# Patient Record
Sex: MALE | HISPANIC OR LATINO | Employment: FULL TIME | URBAN - METROPOLITAN AREA
[De-identification: names, ages, dates, MRNs, and addresses within clinical notes are randomized per-mention and may not be internally consistent; named-entity substitution may affect disease eponyms.]

---

## 2019-08-28 ENCOUNTER — HOSPITAL ENCOUNTER (OUTPATIENT)
Facility: MEDICAL CENTER | Age: 39
End: 2019-08-29
Attending: EMERGENCY MEDICINE | Admitting: INTERNAL MEDICINE

## 2019-08-28 ENCOUNTER — APPOINTMENT (OUTPATIENT)
Dept: RADIOLOGY | Facility: MEDICAL CENTER | Age: 39
End: 2019-08-28
Attending: STUDENT IN AN ORGANIZED HEALTH CARE EDUCATION/TRAINING PROGRAM

## 2019-08-28 ENCOUNTER — APPOINTMENT (OUTPATIENT)
Dept: RADIOLOGY | Facility: MEDICAL CENTER | Age: 39
End: 2019-08-28
Attending: EMERGENCY MEDICINE

## 2019-08-28 DIAGNOSIS — F10.10 ALCOHOL ABUSE: ICD-10-CM

## 2019-08-28 DIAGNOSIS — R10.84 GENERALIZED ABDOMINAL PAIN: ICD-10-CM

## 2019-08-28 DIAGNOSIS — F15.10 METHAMPHETAMINE ABUSE (HCC): ICD-10-CM

## 2019-08-28 DIAGNOSIS — K29.00 OTHER ACUTE GASTRITIS WITHOUT HEMORRHAGE: ICD-10-CM

## 2019-08-28 PROBLEM — E87.6 HYPOKALEMIA: Status: ACTIVE | Noted: 2019-08-28

## 2019-08-28 PROBLEM — K29.70 GASTRITIS: Status: ACTIVE | Noted: 2019-08-28

## 2019-08-28 PROBLEM — K59.00 CN (CONSTIPATION): Status: ACTIVE | Noted: 2019-08-28

## 2019-08-28 PROBLEM — F19.10 POLYSUBSTANCE ABUSE (HCC): Status: ACTIVE | Noted: 2019-08-28

## 2019-08-28 LAB
ALBUMIN SERPL BCP-MCNC: 5.2 G/DL (ref 3.2–4.9)
ALBUMIN/GLOB SERPL: 1.9 G/DL
ALP SERPL-CCNC: 79 U/L (ref 30–99)
ALT SERPL-CCNC: 27 U/L (ref 2–50)
AMPHET UR QL SCN: POSITIVE
ANION GAP SERPL CALC-SCNC: 10 MMOL/L (ref 0–11.9)
APPEARANCE UR: CLEAR
AST SERPL-CCNC: 20 U/L (ref 12–45)
BARBITURATES UR QL SCN: NEGATIVE
BASOPHILS # BLD AUTO: 0.3 % (ref 0–1.8)
BASOPHILS # BLD: 0.03 K/UL (ref 0–0.12)
BENZODIAZ UR QL SCN: NEGATIVE
BILIRUB SERPL-MCNC: 0.5 MG/DL (ref 0.1–1.5)
BILIRUB UR QL STRIP.AUTO: NEGATIVE
BUN SERPL-MCNC: 19 MG/DL (ref 8–22)
BZE UR QL SCN: NEGATIVE
CALCIUM SERPL-MCNC: 9.6 MG/DL (ref 8.5–10.5)
CANNABINOIDS UR QL SCN: NEGATIVE
CHLORIDE SERPL-SCNC: 104 MMOL/L (ref 96–112)
CO2 SERPL-SCNC: 24 MMOL/L (ref 20–33)
COLOR UR: YELLOW
CREAT SERPL-MCNC: 0.91 MG/DL (ref 0.5–1.4)
EOSINOPHIL # BLD AUTO: 0.04 K/UL (ref 0–0.51)
EOSINOPHIL NFR BLD: 0.4 % (ref 0–6.9)
ERYTHROCYTE [DISTWIDTH] IN BLOOD BY AUTOMATED COUNT: 41.9 FL (ref 35.9–50)
ETHANOL BLD-MCNC: 0 G/DL
GLOBULIN SER CALC-MCNC: 2.8 G/DL (ref 1.9–3.5)
GLUCOSE SERPL-MCNC: 133 MG/DL (ref 65–99)
GLUCOSE UR STRIP.AUTO-MCNC: 250 MG/DL
HCT VFR BLD AUTO: 45 % (ref 42–52)
HGB BLD-MCNC: 15.8 G/DL (ref 14–18)
IMM GRANULOCYTES # BLD AUTO: 0.04 K/UL (ref 0–0.11)
IMM GRANULOCYTES NFR BLD AUTO: 0.4 % (ref 0–0.9)
KETONES UR STRIP.AUTO-MCNC: NEGATIVE MG/DL
LEUKOCYTE ESTERASE UR QL STRIP.AUTO: NEGATIVE
LIPASE SERPL-CCNC: 30 U/L (ref 11–82)
LIPASE SERPL-CCNC: 31 U/L (ref 11–82)
LYMPHOCYTES # BLD AUTO: 1.06 K/UL (ref 1–4.8)
LYMPHOCYTES NFR BLD: 11.5 % (ref 22–41)
MAGNESIUM SERPL-MCNC: 2.1 MG/DL (ref 1.5–2.5)
MCH RBC QN AUTO: 30.7 PG (ref 27–33)
MCHC RBC AUTO-ENTMCNC: 35.1 G/DL (ref 33.7–35.3)
MCV RBC AUTO: 87.5 FL (ref 81.4–97.8)
METHADONE UR QL SCN: NEGATIVE
MICRO URNS: ABNORMAL
MONOCYTES # BLD AUTO: 0.35 K/UL (ref 0–0.85)
MONOCYTES NFR BLD AUTO: 3.8 % (ref 0–13.4)
NEUTROPHILS # BLD AUTO: 7.7 K/UL (ref 1.82–7.42)
NEUTROPHILS NFR BLD: 83.6 % (ref 44–72)
NITRITE UR QL STRIP.AUTO: NEGATIVE
NRBC # BLD AUTO: 0 K/UL
NRBC BLD-RTO: 0 /100 WBC
OPIATES UR QL SCN: POSITIVE
OXYCODONE UR QL SCN: NEGATIVE
PCP UR QL SCN: NEGATIVE
PH UR STRIP.AUTO: 6 [PH] (ref 5–8)
PLATELET # BLD AUTO: 206 K/UL (ref 164–446)
PMV BLD AUTO: 10 FL (ref 9–12.9)
POTASSIUM SERPL-SCNC: 3.5 MMOL/L (ref 3.6–5.5)
PROPOXYPH UR QL SCN: NEGATIVE
PROT SERPL-MCNC: 8 G/DL (ref 6–8.2)
PROT UR QL STRIP: NEGATIVE MG/DL
RBC # BLD AUTO: 5.14 M/UL (ref 4.7–6.1)
RBC UR QL AUTO: NEGATIVE
SODIUM SERPL-SCNC: 138 MMOL/L (ref 135–145)
SP GR UR STRIP.AUTO: 1.04
UROBILINOGEN UR STRIP.AUTO-MCNC: 0.2 MG/DL
WBC # BLD AUTO: 9.2 K/UL (ref 4.8–10.8)

## 2019-08-28 PROCEDURE — 81003 URINALYSIS AUTO W/O SCOPE: CPT

## 2019-08-28 PROCEDURE — 700111 HCHG RX REV CODE 636 W/ 250 OVERRIDE (IP): Performed by: STUDENT IN AN ORGANIZED HEALTH CARE EDUCATION/TRAINING PROGRAM

## 2019-08-28 PROCEDURE — 700105 HCHG RX REV CODE 258: Performed by: EMERGENCY MEDICINE

## 2019-08-28 PROCEDURE — 96375 TX/PRO/DX INJ NEW DRUG ADDON: CPT

## 2019-08-28 PROCEDURE — 80307 DRUG TEST PRSMV CHEM ANLYZR: CPT

## 2019-08-28 PROCEDURE — 700101 HCHG RX REV CODE 250: Performed by: STUDENT IN AN ORGANIZED HEALTH CARE EDUCATION/TRAINING PROGRAM

## 2019-08-28 PROCEDURE — 74177 CT ABD & PELVIS W/CONTRAST: CPT

## 2019-08-28 PROCEDURE — 80053 COMPREHEN METABOLIC PANEL: CPT

## 2019-08-28 PROCEDURE — 83735 ASSAY OF MAGNESIUM: CPT

## 2019-08-28 PROCEDURE — A9270 NON-COVERED ITEM OR SERVICE: HCPCS | Performed by: EMERGENCY MEDICINE

## 2019-08-28 PROCEDURE — 700117 HCHG RX CONTRAST REV CODE 255: Performed by: EMERGENCY MEDICINE

## 2019-08-28 PROCEDURE — 70450 CT HEAD/BRAIN W/O DYE: CPT

## 2019-08-28 PROCEDURE — 96365 THER/PROPH/DIAG IV INF INIT: CPT

## 2019-08-28 PROCEDURE — 96372 THER/PROPH/DIAG INJ SC/IM: CPT

## 2019-08-28 PROCEDURE — 700102 HCHG RX REV CODE 250 W/ 637 OVERRIDE(OP): Performed by: EMERGENCY MEDICINE

## 2019-08-28 PROCEDURE — 83690 ASSAY OF LIPASE: CPT | Mod: 91

## 2019-08-28 PROCEDURE — 99218 PR INITIAL OBSERVATION CARE,LEVL I: CPT | Mod: GC | Performed by: INTERNAL MEDICINE

## 2019-08-28 PROCEDURE — G0378 HOSPITAL OBSERVATION PER HR: HCPCS

## 2019-08-28 PROCEDURE — A9270 NON-COVERED ITEM OR SERVICE: HCPCS | Performed by: STUDENT IN AN ORGANIZED HEALTH CARE EDUCATION/TRAINING PROGRAM

## 2019-08-28 PROCEDURE — 700102 HCHG RX REV CODE 250 W/ 637 OVERRIDE(OP): Performed by: STUDENT IN AN ORGANIZED HEALTH CARE EDUCATION/TRAINING PROGRAM

## 2019-08-28 PROCEDURE — 36415 COLL VENOUS BLD VENIPUNCTURE: CPT

## 2019-08-28 PROCEDURE — 96376 TX/PRO/DX INJ SAME DRUG ADON: CPT

## 2019-08-28 PROCEDURE — 99285 EMERGENCY DEPT VISIT HI MDM: CPT

## 2019-08-28 PROCEDURE — 85025 COMPLETE CBC W/AUTO DIFF WBC: CPT

## 2019-08-28 PROCEDURE — 700111 HCHG RX REV CODE 636 W/ 250 OVERRIDE (IP): Performed by: EMERGENCY MEDICINE

## 2019-08-28 RX ORDER — LORAZEPAM 0.5 MG/1
2 TABLET ORAL
Status: DISCONTINUED | OUTPATIENT
Start: 2019-08-28 | End: 2019-08-29 | Stop reason: HOSPADM

## 2019-08-28 RX ORDER — LORAZEPAM 0.5 MG/1
0.5 TABLET ORAL EVERY 4 HOURS PRN
Status: DISCONTINUED | OUTPATIENT
Start: 2019-08-28 | End: 2019-08-29 | Stop reason: HOSPADM

## 2019-08-28 RX ORDER — LORAZEPAM 2 MG/ML
1 INJECTION INTRAMUSCULAR
Status: DISCONTINUED | OUTPATIENT
Start: 2019-08-28 | End: 2019-08-29 | Stop reason: HOSPADM

## 2019-08-28 RX ORDER — POTASSIUM CHLORIDE 20 MEQ/1
40 TABLET, EXTENDED RELEASE ORAL DAILY
Status: DISCONTINUED | OUTPATIENT
Start: 2019-08-28 | End: 2019-08-29 | Stop reason: HOSPADM

## 2019-08-28 RX ORDER — LORAZEPAM 2 MG/ML
2 INJECTION INTRAMUSCULAR
Status: DISCONTINUED | OUTPATIENT
Start: 2019-08-28 | End: 2019-08-29 | Stop reason: HOSPADM

## 2019-08-28 RX ORDER — THIAMINE MONONITRATE (VIT B1) 100 MG
100 TABLET ORAL DAILY
Status: DISCONTINUED | OUTPATIENT
Start: 2019-08-29 | End: 2019-08-29

## 2019-08-28 RX ORDER — LORAZEPAM 1 MG/1
4 TABLET ORAL
Status: DISCONTINUED | OUTPATIENT
Start: 2019-08-28 | End: 2019-08-29 | Stop reason: HOSPADM

## 2019-08-28 RX ORDER — MORPHINE SULFATE 4 MG/ML
4 INJECTION, SOLUTION INTRAMUSCULAR; INTRAVENOUS ONCE
Status: COMPLETED | OUTPATIENT
Start: 2019-08-28 | End: 2019-08-28

## 2019-08-28 RX ORDER — LORAZEPAM 1 MG/1
3 TABLET ORAL
Status: DISCONTINUED | OUTPATIENT
Start: 2019-08-28 | End: 2019-08-29 | Stop reason: HOSPADM

## 2019-08-28 RX ORDER — POLYETHYLENE GLYCOL 3350 17 G/17G
1 POWDER, FOR SOLUTION ORAL
Status: DISCONTINUED | OUTPATIENT
Start: 2019-08-28 | End: 2019-08-29 | Stop reason: HOSPADM

## 2019-08-28 RX ORDER — PANTOPRAZOLE SODIUM 20 MG/1
40 TABLET, DELAYED RELEASE ORAL DAILY
Qty: 30 TAB | Refills: 0 | Status: SHIPPED | OUTPATIENT
Start: 2019-08-28 | End: 2021-08-03

## 2019-08-28 RX ORDER — ACETAMINOPHEN 325 MG/1
650 TABLET ORAL EVERY 6 HOURS PRN
Status: DISCONTINUED | OUTPATIENT
Start: 2019-08-28 | End: 2019-08-29 | Stop reason: HOSPADM

## 2019-08-28 RX ORDER — LORAZEPAM 2 MG/ML
1.5 INJECTION INTRAMUSCULAR
Status: DISCONTINUED | OUTPATIENT
Start: 2019-08-28 | End: 2019-08-29 | Stop reason: HOSPADM

## 2019-08-28 RX ORDER — AMOXICILLIN 250 MG
2 CAPSULE ORAL 2 TIMES DAILY
Status: DISCONTINUED | OUTPATIENT
Start: 2019-08-28 | End: 2019-08-29 | Stop reason: HOSPADM

## 2019-08-28 RX ORDER — OMEPRAZOLE 20 MG/1
40 CAPSULE, DELAYED RELEASE ORAL ONCE
Status: COMPLETED | OUTPATIENT
Start: 2019-08-28 | End: 2019-08-28

## 2019-08-28 RX ORDER — FOLIC ACID 1 MG/1
1 TABLET ORAL DAILY
Status: DISCONTINUED | OUTPATIENT
Start: 2019-08-29 | End: 2019-08-29

## 2019-08-28 RX ORDER — LORAZEPAM 0.5 MG/1
1 TABLET ORAL EVERY 4 HOURS PRN
Status: DISCONTINUED | OUTPATIENT
Start: 2019-08-28 | End: 2019-08-29 | Stop reason: HOSPADM

## 2019-08-28 RX ORDER — ONDANSETRON 2 MG/ML
4 INJECTION INTRAMUSCULAR; INTRAVENOUS ONCE
Status: COMPLETED | OUTPATIENT
Start: 2019-08-28 | End: 2019-08-28

## 2019-08-28 RX ORDER — BISACODYL 10 MG
10 SUPPOSITORY, RECTAL RECTAL
Status: DISCONTINUED | OUTPATIENT
Start: 2019-08-28 | End: 2019-08-29 | Stop reason: HOSPADM

## 2019-08-28 RX ORDER — LORAZEPAM 2 MG/ML
0.5 INJECTION INTRAMUSCULAR EVERY 4 HOURS PRN
Status: DISCONTINUED | OUTPATIENT
Start: 2019-08-28 | End: 2019-08-29 | Stop reason: HOSPADM

## 2019-08-28 RX ORDER — SODIUM CHLORIDE 9 MG/ML
1000 INJECTION, SOLUTION INTRAVENOUS ONCE
Status: COMPLETED | OUTPATIENT
Start: 2019-08-28 | End: 2019-08-28

## 2019-08-28 RX ADMIN — IOHEXOL 100 ML: 350 INJECTION, SOLUTION INTRAVENOUS at 08:15

## 2019-08-28 RX ADMIN — OMEPRAZOLE 40 MG: 20 CAPSULE, DELAYED RELEASE ORAL at 10:43

## 2019-08-28 RX ADMIN — POTASSIUM CHLORIDE 40 MEQ: 20 TABLET, EXTENDED RELEASE ORAL at 16:22

## 2019-08-28 RX ADMIN — ENOXAPARIN SODIUM 40 MG: 100 INJECTION SUBCUTANEOUS at 16:24

## 2019-08-28 RX ADMIN — SENNOSIDES, DOCUSATE SODIUM 2 TABLET: 50; 8.6 TABLET, FILM COATED ORAL at 17:33

## 2019-08-28 RX ADMIN — ONDANSETRON 4 MG: 2 INJECTION INTRAMUSCULAR; INTRAVENOUS at 06:30

## 2019-08-28 RX ADMIN — SODIUM CHLORIDE 1000 ML: 9 INJECTION, SOLUTION INTRAVENOUS at 06:32

## 2019-08-28 RX ADMIN — MORPHINE SULFATE 4 MG: 4 INJECTION INTRAVENOUS at 06:30

## 2019-08-28 RX ADMIN — THIAMINE HYDROCHLORIDE 1000 ML: 100 INJECTION, SOLUTION INTRAMUSCULAR; INTRAVENOUS at 15:40

## 2019-08-28 RX ADMIN — LIDOCAINE HYDROCHLORIDE 30 ML: 20 SOLUTION OROPHARYNGEAL at 09:29

## 2019-08-28 RX ADMIN — ONDANSETRON 4 MG: 2 INJECTION INTRAMUSCULAR; INTRAVENOUS at 10:31

## 2019-08-28 ASSESSMENT — LIFESTYLE VARIABLES
DOES PATIENT WANT TO TALK TO SOMEONE ABOUT QUITTING: NO
TOTAL SCORE: 0
EVER FELT BAD OR GUILTY ABOUT YOUR DRINKING: NO
ALCOHOL_USE: YES
ON A TYPICAL DAY WHEN YOU DRINK ALCOHOL HOW MANY DRINKS DO YOU HAVE: 3
ORIENTATION AND CLOUDING OF SENSORIUM: ORIENTED AND CAN DO SERIAL ADDITIONS
ORIENTATION AND CLOUDING OF SENSORIUM: ORIENTED AND CAN DO SERIAL ADDITIONS
HOW MANY TIMES IN THE PAST YEAR HAVE YOU HAD 5 OR MORE DRINKS IN A DAY: 20
ANXIETY: NO ANXIETY (AT EASE)
EVER HAD A DRINK FIRST THING IN THE MORNING TO STEADY YOUR NERVES TO GET RID OF A HANGOVER: YES
TOTAL SCORE: 2
ANXIETY: NO ANXIETY (AT EASE)
TOTAL SCORE: 2
DOES PATIENT WANT TO STOP DRINKING: YES
HAVE YOU EVER FELT YOU SHOULD CUT DOWN ON YOUR DRINKING: NO
HEADACHE, FULLNESS IN HEAD: NOT PRESENT
AGITATION: NORMAL ACTIVITY
AVERAGE NUMBER OF DAYS PER WEEK YOU HAVE A DRINK CONTAINING ALCOHOL: 3
TOTAL SCORE: 2
TOTAL SCORE: 1
PAROXYSMAL SWEATS: BARELY PERCEPTIBLE SWEATING. PALMS MOIST
VISUAL DISTURBANCES: NOT PRESENT
NAUSEA AND VOMITING: NO NAUSEA AND NO VOMITING
TREMOR: NO TREMOR
CONSUMPTION TOTAL: POSITIVE
HAVE PEOPLE ANNOYED YOU BY CRITICIZING YOUR DRINKING: YES
NAUSEA AND VOMITING: NO NAUSEA AND NO VOMITING
AGITATION: NORMAL ACTIVITY
AUDITORY DISTURBANCES: NOT PRESENT
HEADACHE, FULLNESS IN HEAD: NOT PRESENT
EVER_SMOKED: YES
TREMOR: NO TREMOR
VISUAL DISTURBANCES: NOT PRESENT
PAROXYSMAL SWEATS: NO SWEAT VISIBLE
AUDITORY DISTURBANCES: NOT PRESENT

## 2019-08-28 ASSESSMENT — ENCOUNTER SYMPTOMS
CHILLS: 0
PALPITATIONS: 0
BLOOD IN STOOL: 0
MYALGIAS: 0
DIARRHEA: 0
NAUSEA: 0
DIZZINESS: 0
CONSTIPATION: 1
SHORTNESS OF BREATH: 0
FEVER: 0
VOMITING: 0
ABDOMINAL PAIN: 1

## 2019-08-28 ASSESSMENT — PATIENT HEALTH QUESTIONNAIRE - PHQ9
1. LITTLE INTEREST OR PLEASURE IN DOING THINGS: NOT AT ALL
SUM OF ALL RESPONSES TO PHQ9 QUESTIONS 1 AND 2: 0
2. FEELING DOWN, DEPRESSED, IRRITABLE, OR HOPELESS: NOT AT ALL

## 2019-08-28 NOTE — ED NOTES
Pt remains groggy, difficult to assess while ERP at the bedside.  Eyes open to voice but then back to sleep.  VSS, 97% on RA.

## 2019-08-28 NOTE — ASSESSMENT & PLAN NOTE
The patient reports that he drinks six beers per day. Therefore, he should be monitored for withdrawal symptoms.  No reports of withdrawal symptoms overnight.  No need for CIWA medications.    - Detox IV  - Multivitamin  - CIWA protocol

## 2019-08-28 NOTE — ASSESSMENT & PLAN NOTE
Mr. De Guzman presented with generalized abdominal pain of 12 hours duration. There has been no nausea, vomiting, or diarrhea. Abdominal examination was benign. CBC, CMP, and lipase were normal. CT scan was normal, but appendicitis could not be ruled out. The patient recently ate at a new pizza restaurant with his brother, and the brother experienced abdominal pain and diarrhea several hours after the meal.   -This morning, symptoms have resolved  -Clear liquid diet and advance as tolerated.  - Lipase 30.

## 2019-08-28 NOTE — ED NOTES
Pt has admit orders, awaiting a bed assignment.  Pt remains groggy, rouses to voice.  Denies abd pain currently.

## 2019-08-28 NOTE — CONSULTS
8/28/2019  Surgery consult    CC: Abdominal pain    HPI: Aristides De Guzman is a 39 y.o. male.  He primarily Somali-speaking  He was seen with the assistance of the telemedicine .  He reports 1 to history of abdominal pain.  He states pain is left upper quadrant.  He states his pain is from hunger  He states pain is got significant emergency room in the emergency department.  He states not associated with any fever chills nausea or vomiting.    History reviewed. No pertinent past medical history.    History reviewed. No pertinent surgical history.    No current facility-administered medications for this encounter.      Current Outpatient Medications   Medication Sig Dispense Refill   • pantoprazole (PROTONIX) 20 MG tablet Take 2 Tabs by mouth every day. 30 Tab 0       Social History     Socioeconomic History   • Marital status:      Spouse name: Not on file   • Number of children: Not on file   • Years of education: Not on file   • Highest education level: Not on file   Occupational History   • Not on file   Social Needs   • Financial resource strain: Not on file   • Food insecurity:     Worry: Not on file     Inability: Not on file   • Transportation needs:     Medical: Not on file     Non-medical: Not on file   Tobacco Use   • Smoking status: Current Some Day Smoker   • Smokeless tobacco: Never Used   Substance and Sexual Activity   • Alcohol use: Not Currently   • Drug use: Not Currently   • Sexual activity: Not on file   Lifestyle   • Physical activity:     Days per week: Not on file     Minutes per session: Not on file   • Stress: Not on file   Relationships   • Social connections:     Talks on phone: Not on file     Gets together: Not on file     Attends Catholic service: Not on file     Active member of club or organization: Not on file     Attends meetings of clubs or organizations: Not on file     Relationship status: Not on file   • Intimate partner violence:     Fear of current or ex  "partner: Not on file     Emotionally abused: Not on file     Physically abused: Not on file     Forced sexual activity: Not on file   Other Topics Concern   • Not on file   Social History Narrative   • Not on file       Family History   Family history unknown: Yes       Allergies:  Patient has no known allergies.    Review of Systems:  Positive for abdominal pain as above otherwise review of systems negative per AMA guidelines    Physical Exam:  /85   Pulse 70   Temp 36.1 °C (97 °F)   Resp 16   Ht 1.702 m (5' 7\")   Wt 90.9 kg (200 lb 6.4 oz)   SpO2 99%     Constitutional: Awake, alert, oriented x3. No acute distress. GCS 15. E4 V5 M6.  Head: No cephalohematoma.  Normocephalic atraumatic.    Neck: No tracheal deviation. No stridor.  Cardiovascular: Normal rate, skin warm brisk capillary refill.    Pulmonary/Chest: Breathing with ease no cough.  No chest wall tenderness bilaterally.  No crepitus. Positive breath sounds bilaterally.   Abdominal: Soft, nondistended.  Patient reports tenderness discomfort left upper quadrant but nontender to palpation.  Musculoskeletal: Warm dry moving all palpable pulses.  Neurological: Awake alert appropriate reports sensation intact  Skin: Skin is warm and dry.    Psychiatric:  Normal mood and affect.  Behavior is appropriate.       Labs:  Recent Labs     08/28/19  0625   WBC 9.2   RBC 5.14   HEMOGLOBIN 15.8   HEMATOCRIT 45.0   MCV 87.5   MCH 30.7   MCHC 35.1   RDW 41.9   PLATELETCT 206   MPV 10.0     Recent Labs     08/28/19  0625   SODIUM 138   POTASSIUM 3.5*   CHLORIDE 104   CO2 24   GLUCOSE 133*   BUN 19   CREATININE 0.91   CALCIUM 9.6         Recent Labs     08/28/19  0625   ASTSGOT 20   ALTSGPT 27   TBILIRUBIN 0.5   ALKPHOSPHAT 79   GLOBULIN 2.8       Radiology:  CT-ABDOMEN-PELVIS WITH   Final Result      Minimal mesenteric inflammatory change in the right lower quadrant with no apparent source. As indicated above, the appendix is not visualized. That regard, early " appendicitis cannot be completely excluded, and clinical correlation is recommended. These    findings were discussed with TACO ALMAZAN on 8/28/2019 at 0800 hours.            Assessment: This is a 39 y.o.-year-old male CT scan as above    Plan:   Discussed with patient findings.  Discussed possible appendicitis.  Discussed options including surgery.  Discussed appendectomy risk benefits alternatives.  He demonstrated understanding but declined surgery.  Discussed risks that he may have appendicitis which could grow worse.  He demonstrated understanding and agreed to return if pain returns or increased.        Anmol Loredo MD, FACS  [facility] [phone]

## 2019-08-28 NOTE — ED NOTES
Med Rec completed per patient and family   Allergies reviewed  No ORAL antibiotics in last 14 days

## 2019-08-28 NOTE — ED NOTES
Pt medicated as ordered for upset stomach.  Pt provided a boxed meal per his request and okayed by Dr. Loredo .

## 2019-08-28 NOTE — ED NOTES
Detox bag infusing.  Continue to await a bed assignment.  Pt updated on poc.  Pt denies abd pain & nausea at this time.  Remains somewhat groggy, but more alert from previoius.

## 2019-08-28 NOTE — DISCHARGE INSTRUCTIONS
Discharge Instructions    Discharged to home by car with relative. Discharged via wheelchair, hospital escort: Yes.  Special equipment needed: Not Applicable    Be sure to schedule a follow-up appointment with your primary care doctor or any specialists as instructed.     Discharge Plan:   Diet Plan: Discussed  Activity Level: Discussed  Smoking Cessation Offered: Patient Counseled  Confirmed Follow up Appointment: Patient to Call and Schedule Appointment  Confirmed Symptoms Management: Discussed  Medication Reconciliation Updated: Yes  Influenza Vaccine Indication: Indicated: Not available from distributor/    I understand that a diet low in cholesterol, fat, and sodium is recommended for good health. Unless I have been given specific instructions below for another diet, I accept this instruction as my diet prescription.   Other diet: Regular    Special Instructions: None    · Is patient discharged on Warfarin / Coumadin?   No       Gastritis en los adultos  (Gastritis, Adult)  La gastritis es la irritación (inflamación) del estómago. Si tiene esta afección, puede presentar algunos de estos problemas (síntomas):  · Dolor estomacal.  · Sensación de ardor en el estómago.  · Ganas de vomitar (náuseas).  · Vómitos.  · Sensación de estar demasiado lleno después de comer.  Es importante recibir ayuda para esta afección. Sin ayuda, el estómago puede sangrar y se pueden formar llagas (úlceras).  CUIDADOS EN EL HOGAR  · [place] los medicamentos de venta charo y los recetados solamente juve se lo haya indicado el médico.  · Si le recetaron un antibiótico, tómelo juve se lo haya indicado el médico. No deje de tomarlo aunque comience a sentirse mejor.  · Ana suficiente líquido para mantener el pis (orina) eliud o de color amarillo pálido.  · En lugar de comer en gran cantidad, prepare pequeñas porciones de comida.  SOLICITE AYUDA SI:  · Los problemas empeoran.  · Los problemas desaparecen, chantal luego vuelven a  aparecer.  SOLICITE AYUDA DE INMEDIATO SI:  · Vomita leann o lucita sustancia parecida a los granos de café.  · La materia fecal (heces) es aaliyah o de color adams oscuro.  · No puede retener los líquidos.  · El dolor de estómago empeora.  · Tiene fiebre.  · No mejora luego de 1 semana.  Esta información no tiene juve fin reemplazar el consejo del médico. Asegúrese de hacerle al médico cualquier pregunta que tenga.  Document Released: 06/18/2013 Document Revised: 09/10/2016 Document Reviewed: 09/10/2016  [employer] Interactive Patient Education © 2017 [employer]      Depression / Suicide Risk    As you are discharged from this [facility] facility, it is important to learn how to keep safe from harming yourself.    Recognize the warning signs:  · Abrupt changes in personality, positive or negative- including increase in energy   · Giving away possessions  · Change in eating patterns- significant weight changes-  positive or negative  · Change in sleeping patterns- unable to sleep or sleeping all the time   · Unwillingness or inability to communicate  · Depression  · Unusual sadness, discouragement and loneliness  · Talk of wanting to die  · Neglect of personal appearance   · Rebelliousness- reckless behavior  · Withdrawal from people/activities they love  · Confusion- inability to concentrate     If you or a loved one observes any of these behaviors or has concerns about self-harm, here's what you can do:  · Talk about it- your feelings and reasons for harming yourself  · Remove any means that you might use to hurt yourself (examples: pills, rope, extension cords, firearm)  · Get professional help from the community (Mental Health, Substance Abuse, psychological counseling)  · Do not be alone:Call your Safe Contact- someone whom you trust who will be there for you.  · Call your local CRISIS HOTLINE 854-1534 or 338-148-9714  · Call your local Children's Mobile Crisis Response Team [place] (949) 134-0028 or  www.[employer].Smart Panel  · Call the toll free National Suicide Prevention Hotlines   · National Suicide Prevention Lifeline 963-488-CZQV (7879)  · National Flimper Line Network 800-SUICIDE (504-6049)          Return to emergency department in 24 hours for recheck.  Return sooner for mpre pain or if you have fever or vomiting.  Follow-up with your doctor.

## 2019-08-28 NOTE — ED NOTES
Report received, assuming care.  Pt resting, eyes closed, resp even and unlabored, vss.  Family by the bedside.  awaiting to go to CT.

## 2019-08-28 NOTE — ASSESSMENT & PLAN NOTE
The patient reports that his last bowel movement was three days ago. This may be due to his opioid use.    - Bowel protocol

## 2019-08-28 NOTE — ASSESSMENT & PLAN NOTE
The patient admits to regular use of methamphetamine (smoking) and occasional use of opioids. He denies any IV drug use.     - Patient education and counseling provided  - No further management at this time

## 2019-08-28 NOTE — H&P
Internal Medicine Admitting History and Physical    Note Author: Jeff Augustine M.D.       Name Aristides De Guzman 1980   Age/Sex 39 y.o. male   MRN [id number]   Code Status Full Code     After 5PM or if no immediate response to page, please call for cross-coverage  Attending/Team: Dr. Platt/Nestor See Patient List for primary contact information  Call [phone] to page    1st Call - Day Intern (R1):   Jeff Augustine 2nd Call - Day Sr. Resident (R2/R3):   Cirilo Riojas       Chief Complaint:     Abdominal pain    HPI:    Mr. Aristides De Guzman is a 39-year-old man with a history of alcohol and methamphetamine use disorder presenting for evaluation of abdominal pain of 12 hours duration. History was obtained from the patient as well as his brother and cousin, who had accompanied him to the emergency department.    The patient explains that he tried a new pizza restaurant with his brother and some friends yesterday around noon. A few hours at later, he began to experience progressively worsening abdominal pain. He was able to sleep around midnight, but woke up with severe (10/10) abdominal pain at approximately 1:00 AM and sought care at the emergency department shortly thereafter. The pain is migratory; initially he felt it on the left side, then diffusely, and at present it is most prominent in the epigastric region. It has improved since this morning, but is still significant. In addition, the patient has had no appetite since yesterday, although he has been able to consume small amounts of food.    Notably, the patient's brother also experienced abdominal pain and diarrhea late last night after eating at the pizza restaurant; it resolved within a few hours.     Mr. De Guzman denies any other symptoms, including dizziness, nausea, vomiting, diarrhea, dysuria, melena, and hematochezia. His last bowel movement was three days ago; this is normal for him. He does admit to frequently smoking methamphetamine  and occasional recreational use of opioid pills, with his most recent use yesterday. He drinks six beers per day on average. He repots that he has no chronic medical problems and takes no medications, but has not seen a physician in many years.     ED Course: The patient was afebrile and hemodynamically stable in the emergency department. Physical examination was normal. Abdominal examination was unremarkable; there was no rebound or guarding and Green, McBurney, and Rovsing signs were negative. CBC, CMP, and lipase were normal. U/A revealed glucosuria (250), but was negative for nitrites and leukocyte esterase. UDS was positive for opioids and amphetamine. A CT scan of the abdomen showed minimal mesenteric inflammatory change in the right lower quadrant with no apparent source. The appendix could not be visualized, and hterefore early appendicitis could not be completely excluded.     The patient was initially deemed appropriate for discharge. However, when ambulating in the emergency department, he felt severe pain and appeared unsteady on his feet. He was admitted for further observation and pain control.      Review of Systems   Constitutional: Positive for malaise/fatigue. Negative for chills and fever.   Respiratory: Negative for shortness of breath.    Cardiovascular: Negative for chest pain and palpitations.   Gastrointestinal: Positive for abdominal pain and constipation. Negative for blood in stool, diarrhea, melena, nausea and vomiting.   Genitourinary: Negative for dysuria and urgency.   Musculoskeletal: Negative for myalgias.   Neurological: Negative for dizziness.             Past Medical History (Chronic medical problem, known complications and current treatment)    None    Past Surgical History:  History reviewed. No pertinent surgical history.    Current Outpatient Medications:  Home Medications     Reviewed by Roberta Osborne (Pharmacy Tech) on 08/28/19 at 1232  Med List Status: Complete    Medication Last Dose Status        Patient Devaughn Taking any Medications                       Medication Allergy/Sensitivities:  No Known Allergies      Family History (mandatory)   Family History   Family history unknown: Yes       Social History (mandatory)   Social History     Socioeconomic History   • Marital status:      Spouse name: Not on file   • Number of children: Not on file   • Years of education: Not on file   • Highest education level: Not on file   Occupational History   • Not on file   Social Needs   • Financial resource strain: Not on file   • Food insecurity:     Worry: Not on file     Inability: Not on file   • Transportation needs:     Medical: Not on file     Non-medical: Not on file   Tobacco Use   • Smoking status: Current Some Day Smoker   • Smokeless tobacco: Never Used   Substance and Sexual Activity   • Alcohol use: Not Currently   • Drug use: Not Currently   • Sexual activity: Not on file   Lifestyle   • Physical activity:     Days per week: Not on file     Minutes per session: Not on file   • Stress: Not on file   Relationships   • Social connections:     Talks on phone: Not on file     Gets together: Not on file     Attends Bahai service: Not on file     Active member of club or organization: Not on file     Attends meetings of clubs or organizations: Not on file     Relationship status: Not on file   • Intimate partner violence:     Fear of current or ex partner: Not on file     Emotionally abused: Not on file     Physically abused: Not on file     Forced sexual activity: Not on file   Other Topics Concern   • Not on file   Social History Narrative   • Not on file     PCP : Pcp Pt States None    Physical Exam     Vitals:    08/28/19 1230 08/28/19 1300 08/28/19 1330 08/28/19 1400   BP: 142/85  136/82    Pulse: 70  87    Resp:  16  18   Temp:       TempSrc:       SpO2: 99%  100%    Weight:       Height:         Body mass index is 31.39 kg/m².  O2 therapy: Pulse Oximetry: 100  %, O2 (LPM): 3, O2 Delivery: None (Room Air)    Physical Exam   Constitutional: He is well-developed, well-nourished, and in no distress.   HENT:   Head: Normocephalic and atraumatic.   Eyes: Pupils are equal, round, and reactive to light. EOM are normal.   Neck: Normal range of motion. Neck supple. No JVD present. Thyromegaly present.   Cardiovascular: Normal rate, regular rhythm, normal heart sounds and intact distal pulses. Exam reveals no gallop and no friction rub.   No murmur heard.  Pulmonary/Chest: Effort normal and breath sounds normal. No respiratory distress. He has no wheezes. He has no rales. He exhibits no tenderness.   Abdominal: Soft. Bowel sounds are normal. He exhibits no distension and no mass. There is no tenderness. There is no rebound and no guarding.   Negative Green, McBurney, Rovsing sign   Musculoskeletal: Normal range of motion. He exhibits no edema.   Neurological:   Somnolent but appropriate   Psychiatric: Mood, affect and judgment normal.         Data Review       Old Records Request:   Completed  Current Records review/summary: Completed    Lab Data Review:  Recent Results (from the past 24 hour(s))   CBC WITH DIFFERENTIAL    Collection Time: 08/28/19  6:25 AM   Result Value Ref Range    WBC 9.2 4.8 - 10.8 K/uL    RBC 5.14 4.70 - 6.10 M/uL    Hemoglobin 15.8 14.0 - 18.0 g/dL    Hematocrit 45.0 42.0 - 52.0 %    MCV 87.5 81.4 - 97.8 fL    MCH 30.7 27.0 - 33.0 pg    MCHC 35.1 33.7 - 35.3 g/dL    RDW 41.9 35.9 - 50.0 fL    Platelet Count 206 164 - 446 K/uL    MPV 10.0 9.0 - 12.9 fL    Neutrophils-Polys 83.60 (H) 44.00 - 72.00 %    Lymphocytes 11.50 (L) 22.00 - 41.00 %    Monocytes 3.80 0.00 - 13.40 %    Eosinophils 0.40 0.00 - 6.90 %    Basophils 0.30 0.00 - 1.80 %    Immature Granulocytes 0.40 0.00 - 0.90 %    Nucleated RBC 0.00 /100 WBC    Neutrophils (Absolute) 7.70 (H) 1.82 - 7.42 K/uL    Lymphs (Absolute) 1.06 1.00 - 4.80 K/uL    Monos (Absolute) 0.35 0.00 - 0.85 K/uL    Eos  (Absolute) 0.04 0.00 - 0.51 K/uL    Baso (Absolute) 0.03 0.00 - 0.12 K/uL    Immature Granulocytes (abs) 0.04 0.00 - 0.11 K/uL    NRBC (Absolute) 0.00 K/uL   COMP METABOLIC PANEL    Collection Time: 08/28/19  6:25 AM   Result Value Ref Range    Sodium 138 135 - 145 mmol/L    Potassium 3.5 (L) 3.6 - 5.5 mmol/L    Chloride 104 96 - 112 mmol/L    Co2 24 20 - 33 mmol/L    Anion Gap 10.0 0.0 - 11.9    Glucose 133 (H) 65 - 99 mg/dL    Bun 19 8 - 22 mg/dL    Creatinine 0.91 0.50 - 1.40 mg/dL    Calcium 9.6 8.5 - 10.5 mg/dL    AST(SGOT) 20 12 - 45 U/L    ALT(SGPT) 27 2 - 50 U/L    Alkaline Phosphatase 79 30 - 99 U/L    Total Bilirubin 0.5 0.1 - 1.5 mg/dL    Albumin 5.2 (H) 3.2 - 4.9 g/dL    Total Protein 8.0 6.0 - 8.2 g/dL    Globulin 2.8 1.9 - 3.5 g/dL    A-G Ratio 1.9 g/dL   LIPASE    Collection Time: 08/28/19  6:25 AM   Result Value Ref Range    Lipase 31 11 - 82 U/L   ESTIMATED GFR    Collection Time: 08/28/19  6:25 AM   Result Value Ref Range    GFR If African American >60 >60 mL/min/1.73 m 2    GFR If Non African American >60 >60 mL/min/1.73 m 2   DIAGNOSTIC ALCOHOL    Collection Time: 08/28/19  6:25 AM   Result Value Ref Range    Diagnostic Alcohol 0.00 0.00 g/dL   MAGNESIUM    Collection Time: 08/28/19  6:25 AM   Result Value Ref Range    Magnesium 2.1 1.5 - 2.5 mg/dL   URINALYSIS CULTURE, IF INDICATED    Collection Time: 08/28/19  8:40 AM   Result Value Ref Range    Color Yellow     Character Clear     Specific Gravity 1.040 <1.035    Ph 6.0 5.0 - 8.0    Glucose 250 (A) Negative mg/dL    Ketones Negative Negative mg/dL    Protein Negative Negative mg/dL    Bilirubin Negative Negative    Urobilinogen, Urine 0.2 Negative    Nitrite Negative Negative    Leukocyte Esterase Negative Negative    Occult Blood Negative Negative    Micro Urine Req see below    URINE DRUG SCREEN    Collection Time: 08/28/19  8:40 AM   Result Value Ref Range    Amphetamines Urine Positive (A) Negative    Barbiturates Negative Negative     Benzodiazepines Negative Negative    Cocaine Metabolite Negative Negative    Methadone Negative Negative    Opiates Positive (A) Negative    Oxycodone Negative Negative    Phencyclidine -Pcp Negative Negative    Propoxyphene Negative Negative    Cannabinoid Metab Negative Negative       Imaging/Procedures Review:    Independant Imaging Review: Completed  CT-ABDOMEN-PELVIS WITH   Final Result      Minimal mesenteric inflammatory change in the right lower quadrant with no apparent source. As indicated above, the appendix is not visualized. That regard, early appendicitis cannot be completely excluded, and clinical correlation is recommended. These    findings were discussed with TACO ALMAZAN on 8/28/2019 at 0800 hours.      CT-HEAD W/O    (Results Pending)            EKG:   EKG Independent Review: Deferred      Records reviewed and summarized in current documentation :  Yes  [employer] teaching service handout given to patient:  No         Assessment/Plan     Gastritis  Assessment & Plan  Mr. De Guzman presented with generalized abdominal pain of 12 hours duration. There has been no nausea, vomiting, or diarrhea. Abdominal examination was benign. CBC, CMP, and lipase were normal. CT scan was normal, but appendicitis could not be ruled out. The patient recently ate at a new pizza restaurant with his brother, and the brother experienced abdominal pain and diarrhea several hours after the meal.    Based on the above-listed findings, gastritis is the most likely diagnosis. However, appendicitis can not yet be definitively excluded. In addition, given the patient's alcohol use history, pancreatitis should remain on the differential.     - Admit to medical floor for observation  - Clear liquid diet   - Advance to normal diet as tolerated  - Repeat lipase at 1800 to definitively exclude pancreatitis  - CBC and CMP tomorrow AM  - Tylenol PRN for pain  - Monitor for increasing pain, nausea, and vomiting    Polysubstance abuse  (HCC)  Assessment & Plan  The patient admits to regular use of methamphetamine (smoking) and occasional use of opioids. He denies any IV drug use.     - Patient education and counseling provided  - No further management at this time    Alcohol use disorder, mild, abuse  Assessment & Plan  The patient reports that he drinks six beers per day. Therefore, he should be monitored for withdrawal symptoms.    - Detox IV  - Multivitamin  - CIWA protocol    Hypokalemia  Assessment & Plan  The patient had mild hypokalemia (3.5) at admission.    - Replete      Anticipated Hospital stay: Observation admit        Quality Measures  Quality-Core Measures  PCP: Pcp Pt States None

## 2019-08-28 NOTE — ED NOTES
To the bedside to attempt to wake pt and ambulate.  Pt awoke after some convincing.  Pt ambulated down the hallway with stand by assist.  Pt leaning on wall to get himself to bathroom, pt unsteady, not comfortable with patient using restroom on his own due to fall risk.  Assisted back to his room and gurney.  Pt back to sleep.  ERP notified.

## 2019-08-28 NOTE — ED PROVIDER NOTES
ED Provider Note    CHIEF COMPLAINT  Chief Complaint   Patient presents with   • Abdominal Pain     Diffuse and constant since 3pm yesterday       HPI  Aristides De Guzman is a 39 y.o. male who presents to the emergency department complaining of abdominal pain.  Patient states he has diffuse abdominal pain.  He reports this started around 3:00 yesterday afternoon.  Came on slowly but acutely crescendoed about 6 hours ago.  The pain is diffuse.  Is poorly localized and it is severe.  Denies any nausea vomiting diarrhea fevers or chills.  Nothing makes it better nothing makes it worse.  History is somewhat limited because the patient is a poor historian.    He reports daily methamphetamine abuse and alcohol abuse.  Denies any previous surgeries.  Denies any medical problems or allergies.    Subsequently in his hospital visit he seemed a little more awake and alert.  History was again taken with a  from the iPad.    REVIEW OF SYSTEMS  See HPI for further details. All other systems are negative.    PAST MEDICAL HISTORY  History reviewed. No pertinent past medical history.    FAMILY HISTORY  Family History   Family history unknown: Yes       SOCIAL HISTORY  Social History     Socioeconomic History   • Marital status: Not on file     Spouse name: Not on file   • Number of children: Not on file   • Years of education: Not on file   • Highest education level: Not on file   Occupational History   • Not on file   Social Needs   • Financial resource strain: Not on file   • Food insecurity:     Worry: Not on file     Inability: Not on file   • Transportation needs:     Medical: Not on file     Non-medical: Not on file   Tobacco Use   • Smoking status: Current Some Day Smoker   • Smokeless tobacco: Never Used   Substance and Sexual Activity   • Alcohol use: Not Currently   • Drug use: Not Currently   • Sexual activity: Not on file   Lifestyle   • Physical activity:     Days per week: Not on file     Minutes per  "session: Not on file   • Stress: Not on file   Relationships   • Social connections:     Talks on phone: Not on file     Gets together: Not on file     Attends [employer] service: Not on file     Active member of club or organization: Not on file     Attends meetings of clubs or organizations: Not on file     Relationship status: Not on file   • Intimate partner violence:     Fear of current or ex partner: Not on file     Emotionally abused: Not on file     Physically abused: Not on file     Forced sexual activity: Not on file   Other Topics Concern   • Not on file   Social History Narrative   • Not on file       SURGICAL HISTORY  History reviewed. No pertinent surgical history.    CURRENT MEDICATIONS  Home Medications     Reviewed by Deanne Najera R.N. (Registered Nurse) on 08/28/19 at 0547  Med List Status: <None>   Medication Last Dose Status        Patient Devaughn Taking any Medications                       ALLERGIES  No Known Allergies    PHYSICAL EXAM  VITAL SIGNS: BP (!) 166/89   Pulse 69   Temp 36 °C (96.8 °F) (Temporal)   Resp 16   Ht 1.702 m (5' 7\") Comment: Simultaneous filing. User may not have seen previous data.  Wt 90.9 kg (200 lb 6.4 oz)   SpO2 100%   BMI 31.39 kg/m²    Constitutional: Awake nontoxic no acute distress  HENT: Normocephalic, Atraumatic, Bilateral external ears normal, Oropharynx moist, No oral exudates, Nose normal.   Eyes: PERRL, EOMI, Conjunctiva normal, No discharge.   Neck: Normal range of motion, No tenderness,    Cardiovascular: Normal heart rate, Normal rhythm, No murmurs, No rubs, No gallops.   Thorax & Lungs: Normal breath sounds, No respiratory distress, No wheezing, No chest tenderness.   Abdomen: Bowel sounds normal, Soft, tender in epigastrium right lower quadrant no apparent nidus.  Skin: Warm, Dry, No erythema, No rash.   Back: No tenderness, No CVA tenderness.    Musculoskeletal: Good range of motion in all major joints.  Neurologic: Alert,  No focal deficits " noted.   Psychiatric: Affect normal    Results for orders placed or performed during the hospital encounter of 08/28/19   CBC WITH DIFFERENTIAL   Result Value Ref Range    WBC 9.2 4.8 - 10.8 K/uL    RBC 5.14 4.70 - 6.10 M/uL    Hemoglobin 15.8 14.0 - 18.0 g/dL    Hematocrit 45.0 42.0 - 52.0 %    MCV 87.5 81.4 - 97.8 fL    MCH 30.7 27.0 - 33.0 pg    MCHC 35.1 33.7 - 35.3 g/dL    RDW 41.9 35.9 - 50.0 fL    Platelet Count 206 164 - 446 K/uL    MPV 10.0 9.0 - 12.9 fL    Neutrophils-Polys 83.60 (H) 44.00 - 72.00 %    Lymphocytes 11.50 (L) 22.00 - 41.00 %    Monocytes 3.80 0.00 - 13.40 %    Eosinophils 0.40 0.00 - 6.90 %    Basophils 0.30 0.00 - 1.80 %    Immature Granulocytes 0.40 0.00 - 0.90 %    Nucleated RBC 0.00 /100 WBC    Neutrophils (Absolute) 7.70 (H) 1.82 - 7.42 K/uL    Lymphs (Absolute) 1.06 1.00 - 4.80 K/uL    Monos (Absolute) 0.35 0.00 - 0.85 K/uL    Eos (Absolute) 0.04 0.00 - 0.51 K/uL    Baso (Absolute) 0.03 0.00 - 0.12 K/uL    Immature Granulocytes (abs) 0.04 0.00 - 0.11 K/uL    NRBC (Absolute) 0.00 K/uL   COMP METABOLIC PANEL   Result Value Ref Range    Sodium 138 135 - 145 mmol/L    Potassium 3.5 (L) 3.6 - 5.5 mmol/L    Chloride 104 96 - 112 mmol/L    Co2 24 20 - 33 mmol/L    Anion Gap 10.0 0.0 - 11.9    Glucose 133 (H) 65 - 99 mg/dL    Bun 19 8 - 22 mg/dL    Creatinine 0.91 0.50 - 1.40 mg/dL    Calcium 9.6 8.5 - 10.5 mg/dL    AST(SGOT) 20 12 - 45 U/L    ALT(SGPT) 27 2 - 50 U/L    Alkaline Phosphatase 79 30 - 99 U/L    Total Bilirubin 0.5 0.1 - 1.5 mg/dL    Albumin 5.2 (H) 3.2 - 4.9 g/dL    Total Protein 8.0 6.0 - 8.2 g/dL    Globulin 2.8 1.9 - 3.5 g/dL    A-G Ratio 1.9 g/dL   LIPASE   Result Value Ref Range    Lipase 31 11 - 82 U/L   URINALYSIS CULTURE, IF INDICATED   Result Value Ref Range    Color Yellow     Character Clear     Specific Gravity 1.040 <1.035    Ph 6.0 5.0 - 8.0    Glucose 250 (A) Negative mg/dL    Ketones Negative Negative mg/dL    Protein Negative Negative mg/dL    Bilirubin Negative  Negative    Urobilinogen, Urine 0.2 Negative    Nitrite Negative Negative    Leukocyte Esterase Negative Negative    Occult Blood Negative Negative    Micro Urine Req see below    ESTIMATED GFR   Result Value Ref Range    GFR If African American >60 >60 mL/min/1.73 m 2    GFR If Non African American >60 >60 mL/min/1.73 m 2        RADIOLOGY/PROCEDURES  CT-ABDOMEN-PELVIS WITH   Final Result      Minimal mesenteric inflammatory change in the right lower quadrant with no apparent source. As indicated above, the appendix is not visualized. That regard, early appendicitis cannot be completely excluded, and clinical correlation is recommended. These    findings were discussed with TACO ALMAZAN on 8/28/2019 at 0800 hours.            COURSE & MEDICAL DECISION MAKING  Pertinent Labs & Imaging studies reviewed. (See chart for details)      Patient presents emergency department complaining of diffuse abdominal pain.  Broad official diagnosis was considered including but not limited to gastritis, pink otitis, colitis, gastroenteritis, acute appendicitis, to name a few.  Also included perforation.    Patient is worked up with labs, imaging and he is treated for pain with morphine and Zofran.    Patient had decreased oral intake since he had his pain.  The patient must remain n.p.o. while he completes his work-up.  Therefore he cannot take oral fluids.    Patient is reassessed after IV fluids and is improved.      I was called by the radiologist with the CT results.  The radiologist concerned about appendicitis because cc of inflammatory changes right lower quadrant although his appendix is not visualized.    The patient initially did have some tenderness the right lower quadrant reassessment he has no fever no white count and now no tenderness right lower quadrant mostly upper abdominal pain.  He is hungry.    Difficult to discount a radiology read with possible early appendicitis.  Therefore consult with the general surgeon  Dr. De La Cruz who saw the patient feels he can go home.      Patient given a GI cocktail and tolerating food and fluids.    This point he will be discharged home with close return precautions.  I will ask him to return to emerge department 24 hours for recheck because of the abnormal CT.  He can return sooner for worsening pain fever or vomiting.  Verbalizes understanding.  He states he will comply.        The patient was seen to be discharged.  While here he is continued to be very somnolent and difficult to arouse for this is not the morphine.  This was several hours ago is not receiving frequent doses.  I have added a drug screen alcohol level.  His abdomen is  but mostly over the stomach in the epigastrium.  No lower abdominal tenderness.  History and physical exam do not suggest cholecystitis.  He certainly could have gastritis.  He is given a PPI.    Patient wakes up for examination repeat examination but quickly falls back asleep.  Nurses state he is very unstable on his feet when he tries to walk.  At this point after prolonged period of observation the emergency department is clear he cannot be discharged home.  He will be admitted for observation and treatment.  Ad[facility] for continued work-up and treatment.      FINAL IMPRESSION  1. Generalized abdominal pain     2. Alcohol abuse     3. Methamphetamine abuse (HCC)         2.   3.         Electronically signed by: Nestor Deras, 8/28/2019 6:46 AM

## 2019-08-28 NOTE — ED NOTES
Pt WC to RM 25.  Agree with triage note, pt unable to pin point exact location of abdominal pain, but right now 10/10 pain in Middle/lower abdomen.  Pt states he uses Meth daily, last use yesterday afternoon.

## 2019-08-29 VITALS
DIASTOLIC BLOOD PRESSURE: 57 MMHG | SYSTOLIC BLOOD PRESSURE: 159 MMHG | BODY MASS INDEX: 31.45 KG/M2 | TEMPERATURE: 98.5 F | HEIGHT: 67 IN | OXYGEN SATURATION: 97 % | HEART RATE: 78 BPM | WEIGHT: 200.4 LBS | RESPIRATION RATE: 17 BRPM

## 2019-08-29 LAB
ALBUMIN SERPL BCP-MCNC: 4.3 G/DL (ref 3.2–4.9)
ALBUMIN/GLOB SERPL: 1.5 G/DL
ALP SERPL-CCNC: 85 U/L (ref 30–99)
ALT SERPL-CCNC: 29 U/L (ref 2–50)
ANION GAP SERPL CALC-SCNC: 8 MMOL/L (ref 0–11.9)
APPEARANCE UR: CLEAR
AST SERPL-CCNC: 18 U/L (ref 12–45)
BASOPHILS # BLD AUTO: 0.2 % (ref 0–1.8)
BASOPHILS # BLD: 0.02 K/UL (ref 0–0.12)
BILIRUB SERPL-MCNC: 0.9 MG/DL (ref 0.1–1.5)
BILIRUB UR QL STRIP.AUTO: NEGATIVE
BUN SERPL-MCNC: 10 MG/DL (ref 8–22)
CALCIUM SERPL-MCNC: 9.5 MG/DL (ref 8.5–10.5)
CHLORIDE SERPL-SCNC: 98 MMOL/L (ref 96–112)
CO2 SERPL-SCNC: 29 MMOL/L (ref 20–33)
COLOR UR: YELLOW
CREAT SERPL-MCNC: 0.98 MG/DL (ref 0.5–1.4)
EOSINOPHIL # BLD AUTO: 0.01 K/UL (ref 0–0.51)
EOSINOPHIL NFR BLD: 0.1 % (ref 0–6.9)
ERYTHROCYTE [DISTWIDTH] IN BLOOD BY AUTOMATED COUNT: 41.5 FL (ref 35.9–50)
GLOBULIN SER CALC-MCNC: 2.8 G/DL (ref 1.9–3.5)
GLUCOSE SERPL-MCNC: 128 MG/DL (ref 65–99)
GLUCOSE UR STRIP.AUTO-MCNC: NEGATIVE MG/DL
HCT VFR BLD AUTO: 47.3 % (ref 42–52)
HGB BLD-MCNC: 15.7 G/DL (ref 14–18)
IMM GRANULOCYTES # BLD AUTO: 0.03 K/UL (ref 0–0.11)
IMM GRANULOCYTES NFR BLD AUTO: 0.3 % (ref 0–0.9)
KETONES UR STRIP.AUTO-MCNC: NEGATIVE MG/DL
LEUKOCYTE ESTERASE UR QL STRIP.AUTO: NEGATIVE
LYMPHOCYTES # BLD AUTO: 1.24 K/UL (ref 1–4.8)
LYMPHOCYTES NFR BLD: 10.4 % (ref 22–41)
MCH RBC QN AUTO: 29.2 PG (ref 27–33)
MCHC RBC AUTO-ENTMCNC: 33.2 G/DL (ref 33.7–35.3)
MCV RBC AUTO: 87.9 FL (ref 81.4–97.8)
MICRO URNS: NORMAL
MONOCYTES # BLD AUTO: 0.89 K/UL (ref 0–0.85)
MONOCYTES NFR BLD AUTO: 7.5 % (ref 0–13.4)
NEUTROPHILS # BLD AUTO: 9.72 K/UL (ref 1.82–7.42)
NEUTROPHILS NFR BLD: 81.5 % (ref 44–72)
NITRITE UR QL STRIP.AUTO: NEGATIVE
NRBC # BLD AUTO: 0 K/UL
NRBC BLD-RTO: 0 /100 WBC
PH UR STRIP.AUTO: 6 [PH] (ref 5–8)
PLATELET # BLD AUTO: 216 K/UL (ref 164–446)
PMV BLD AUTO: 10.2 FL (ref 9–12.9)
POTASSIUM SERPL-SCNC: 4.3 MMOL/L (ref 3.6–5.5)
PROT SERPL-MCNC: 7.1 G/DL (ref 6–8.2)
PROT UR QL STRIP: NEGATIVE MG/DL
RBC # BLD AUTO: 5.38 M/UL (ref 4.7–6.1)
RBC UR QL AUTO: NEGATIVE
SODIUM SERPL-SCNC: 135 MMOL/L (ref 135–145)
SP GR UR STRIP.AUTO: 1.01
UROBILINOGEN UR STRIP.AUTO-MCNC: 0.2 MG/DL
WBC # BLD AUTO: 11.9 K/UL (ref 4.8–10.8)

## 2019-08-29 PROCEDURE — 81003 URINALYSIS AUTO W/O SCOPE: CPT

## 2019-08-29 PROCEDURE — 700111 HCHG RX REV CODE 636 W/ 250 OVERRIDE (IP): Performed by: STUDENT IN AN ORGANIZED HEALTH CARE EDUCATION/TRAINING PROGRAM

## 2019-08-29 PROCEDURE — 96372 THER/PROPH/DIAG INJ SC/IM: CPT

## 2019-08-29 PROCEDURE — 80053 COMPREHEN METABOLIC PANEL: CPT

## 2019-08-29 PROCEDURE — A9270 NON-COVERED ITEM OR SERVICE: HCPCS | Performed by: STUDENT IN AN ORGANIZED HEALTH CARE EDUCATION/TRAINING PROGRAM

## 2019-08-29 PROCEDURE — 700102 HCHG RX REV CODE 250 W/ 637 OVERRIDE(OP): Performed by: STUDENT IN AN ORGANIZED HEALTH CARE EDUCATION/TRAINING PROGRAM

## 2019-08-29 PROCEDURE — 85025 COMPLETE CBC W/AUTO DIFF WBC: CPT

## 2019-08-29 PROCEDURE — 36415 COLL VENOUS BLD VENIPUNCTURE: CPT

## 2019-08-29 PROCEDURE — G0378 HOSPITAL OBSERVATION PER HR: HCPCS

## 2019-08-29 PROCEDURE — 99217 PR OBSERVATION CARE DISCHARGE: CPT | Performed by: INTERNAL MEDICINE

## 2019-08-29 RX ADMIN — SENNOSIDES, DOCUSATE SODIUM 2 TABLET: 50; 8.6 TABLET, FILM COATED ORAL at 04:55

## 2019-08-29 RX ADMIN — ENOXAPARIN SODIUM 40 MG: 100 INJECTION SUBCUTANEOUS at 04:56

## 2019-08-29 RX ADMIN — POTASSIUM CHLORIDE 40 MEQ: 20 TABLET, EXTENDED RELEASE ORAL at 04:54

## 2019-08-29 RX ADMIN — THERA TABS 1 TABLET: TAB at 04:53

## 2019-08-29 RX ADMIN — FOLIC ACID 1 MG: 1 TABLET ORAL at 04:54

## 2019-08-29 RX ADMIN — Medication 100 MG: at 04:53

## 2019-08-29 ASSESSMENT — COGNITIVE AND FUNCTIONAL STATUS - GENERAL
MOBILITY SCORE: 24
SUGGESTED CMS G CODE MODIFIER MOBILITY: CH
SUGGESTED CMS G CODE MODIFIER DAILY ACTIVITY: CH
DAILY ACTIVITIY SCORE: 24

## 2019-08-29 ASSESSMENT — LIFESTYLE VARIABLES
AUDITORY DISTURBANCES: NOT PRESENT
HEADACHE, FULLNESS IN HEAD: NOT PRESENT
TOTAL SCORE: 3
AGITATION: NORMAL ACTIVITY
AUDITORY DISTURBANCES: NOT PRESENT
VISUAL DISTURBANCES: NOT PRESENT
HEADACHE, FULLNESS IN HEAD: NOT PRESENT
AGITATION: NORMAL ACTIVITY
ANXIETY: NO ANXIETY (AT EASE)
TOTAL SCORE: 1
AUDITORY DISTURBANCES: NOT PRESENT
ORIENTATION AND CLOUDING OF SENSORIUM: ORIENTED AND CAN DO SERIAL ADDITIONS
ORIENTATION AND CLOUDING OF SENSORIUM: ORIENTED AND CAN DO SERIAL ADDITIONS
TREMOR: NO TREMOR
VISUAL DISTURBANCES: NOT PRESENT
NAUSEA AND VOMITING: NO NAUSEA AND NO VOMITING
TREMOR: NO TREMOR
HEADACHE, FULLNESS IN HEAD: NOT PRESENT
NAUSEA AND VOMITING: NO NAUSEA AND NO VOMITING
AGITATION: NORMAL ACTIVITY
PAROXYSMAL SWEATS: BARELY PERCEPTIBLE SWEATING. PALMS MOIST
PAROXYSMAL SWEATS: BARELY PERCEPTIBLE SWEATING. PALMS MOIST
VISUAL DISTURBANCES: NOT PRESENT
ORIENTATION AND CLOUDING OF SENSORIUM: DATE DISORIENTATION BY NO MORE THAN TWO CALENDAR DAYS
PAROXYSMAL SWEATS: BARELY PERCEPTIBLE SWEATING. PALMS MOIST
TOTAL SCORE: 1
ANXIETY: NO ANXIETY (AT EASE)
ANXIETY: NO ANXIETY (AT EASE)
NAUSEA AND VOMITING: NO NAUSEA AND NO VOMITING
TREMOR: NO TREMOR

## 2019-08-29 ASSESSMENT — ENCOUNTER SYMPTOMS
NAUSEA: 0
DIZZINESS: 0
PALPITATIONS: 0
SHORTNESS OF BREATH: 0
DIARRHEA: 0
CONSTIPATION: 0
BLOOD IN STOOL: 0
ABDOMINAL PAIN: 0
FEVER: 0
VOMITING: 0
WHEEZING: 0

## 2019-08-29 NOTE — DISCHARGE PLANNING
Anticipated Discharge Disposition: Home    Action: Spoke with Eleazar patients' brother.Use of the  ID # [id number]. Patient has no PCP or Rx coverage. Patient has no insurance. He is independent of ADL's and IADL's. Patient lives with his brother.CM will continue to monitor.    Barriers to Discharge: Surgical/Medical Clearance    Plan: To be determined.    Care Transition Team Assessment    Information Source  Orientation : Oriented x 4  Information Given By: Relative  Informant's Name: Eleazar  Who is responsible for making decisions for patient? : Patient    Readmission Evaluation  Is this a readmission?: No    Elopement Risk  Legal Hold: No  Ambulatory or Self Mobile in Wheelchair: Yes  Disoriented: No  Psychiatric Symptoms: None  History of Wandering: No  Elopement this Admit: No  Vocalizing Wanting to Leave: No  Displays Behaviors, Body Language Wanting to Leave: No-Not at Risk for Elopement  Elopement Risk: Not at Risk for Elopement    Interdisciplinary Discharge Planning  Primary Care Physician: None  Lives with - Patient's Self Care Capacity: (Lives with brother)  Patient or legal guardian wants to designate a caregiver (see row info): No  Support Systems: Family Member(s)  Housing / Facility: 1 Story Apartment / Condo  Able to Return to Previous ADL's: Yes  Mobility Issues: No  Prior Services: None  Patient Expects to be Discharged to:: Home  Assistance Needed: No  Durable Medical Equipment: Not Applicable    Discharge Preparedness  What is your plan after discharge?: Home with help  What are your discharge supports?: Sibling  Prior Functional Level: Ambulatory, Drives Self, Independent with Activities of Daily Living, Independent with Medication Management  Difficulity with ADLs: None  Difficulity with IADLs: None    Functional Assesment  Prior Functional Level: Ambulatory, Drives Self, Independent with Activities of Daily Living, Independent with Medication Management    Finances  Financial  Barriers to Discharge: Yes  Prescription Coverage: No    Vision / Hearing Impairment  Vision Impairment : No  Hearing Impairment : No         Advance Directive  Advance Directive?: None  Advance Directive offered?: AD Booklet refused    Domestic Abuse  Have you ever been the victim of abuse or violence?: No  Physical Abuse or Sexual Abuse: No  Verbal Abuse or Emotional Abuse: No  Possible Abuse Reported to:: Not Applicable    Psychological Assessment  History of Substance Abuse: Alcohol, Methamphetamine  Date Last Used - Alcohol: 8/27/19  History of Psychiatric Problems: No    Discharge Risks or Barriers  Discharge risks or barriers?: No PCP, Uninsured / underinsured, Substance abuse, No  Patient risk factors: Language barrier, No PCP, Substance abuse, Uninsured or underinsured    Anticipated Discharge Information  Anticipated discharge disposition: Home  Discharge Address: [address] [place] [address]  Discharge Contact Phone Number: [phone]

## 2019-08-29 NOTE — DISCHARGE SUMMARY
Internal Medicine Discharge Summary  Note Author: Buzz Platt M.D.       Name Aristides De Guzman 1980   Age/Sex 39 y.o. male   MRN [id number]         Admit Date:  2019       Discharge Date:   2019    Service:   [facility] Internal Medicine Red Team  Attending Physician(s):   Giulia       Senior Resident(s):   Beulah  Manohar Resident(s):   Fawn  PCP: Pcp Pt States None      Primary Diagnosis:   Gastritis    Secondary Diagnoses:                Active Problems:    Gastritis POA: Unknown    Alcohol use disorder, mild, abuse POA: Unknown    Polysubstance abuse (HCC) POA: Unknown    Hypokalemia POA: Unknown  Resolved Problems:    * No resolved hospital problems. *      Hospital Summary (Brief Narrative):       Mr. De Guzman is a 39 year old male with past medical history significant for substance abuse presented initially to the ER with complaints of acute onset diffuse abdominal pain. He was evaluated with a CT scan which showed possibility of appendicitis but surgeon evaluated the patient and determined otherwise. He was to be discharged, however he became somnolent which was likely secondary to pain medication he received while being evaluated for the abdominal pain. A CT scan did not show any acute process. He eventually came back to baseline and by next day abdominal pain resolved as well. He was discharged in a stable condition after extensive counseling to quit substance abuse.    Patient /Hospital Summary (Details -- Problem Oriented) :          Gastritis  Assessment & Plan  Appreciate surgery's help in ruling out gastritis.  Diet as tolerated, soft diet was advised for a few days.  Labs were unremarkable.  Discharged on a PPI.    Polysubstance abuse (HCC)  Assessment & Plan  The patient admits to regular use of methamphetamine (smoking) and occasional use of opioids. He denies any IV drug use.    Patient education and counseling provided    Alcohol use disorder, mild, abuse  Assessment &  Plan  The patient reports that he drinks six beers per day.   Counseled on alcohol cessation.  Information was offered to patient.    Hypokalemia  Assessment & Plan  Resolved on discharge.      Consultants:     Surgery: Dr. Loredo    Procedures:        None    Imaging/ Testing:      CT-HEAD W/O   Final Result      Negative noncontrast CT scan of the head / brain.      CT-ABDOMEN-PELVIS WITH   Final Result      Minimal mesenteric inflammatory change in the right lower quadrant with no apparent source. As indicated above, the appendix is not visualized. That regard, early appendicitis cannot be completely excluded, and clinical correlation is recommended. These    findings were discussed with TACO ALMAZAN on 8/28/2019 at 0800 hours.            Discharge Medications:         Medication Reconciliation: Completed       Medication List      CONTINUE taking these medications      Instructions   pantoprazole 20 MG tablet  Commonly known as:  PROTONIX   Take 2 Tabs by mouth every day.  Dose:  40 mg                Disposition:   Discharged home    Diet:   Soft diet for a few days, advance as tolerated.    Activity:   As tolerated.    Instructions:      Strongly counseled to quit substance use.   The patient was instructed to return to the ER in the event of worsening symptoms. I have counseled the patient on the importance of compliance and the patient has agreed to proceed with all medical recommendations and follow up plan indicated above.   The patient understands that all medications come with benefits and risks. Risks may include permanent injury or death and these risks can be minimized with close reassessment and monitoring.        Primary Care Provider:    Pcp Pt States None    Discharge summary faxed to primary care provider:  Deferred  Copy of discharge summary given to the patient: Deferred      Follow up appointment details :      No future appointments.  No follow-up provider specified.  Advised to get  established with PCP for regular preventive care.    Pending Studies:        None pending.    Time spent on discharge day patient visit, preparing discharge paperwork and arranging for patient follow up.      Discharge Time (Minutes) :    26 minutes  Hospital Course Type: Observation Stay      Condition on Discharge  : Stable, alert and ambulatory  ______________________________________________________________________    Interval history/exam for day of discharge:     Patient feeling well.  used for communication. Denies any abdominal pain, nausea or vomiting.    Physical exam:  Constitutional: Appears comfortable, not in distress  HEENT: Normocephalic, atraumatic  Cardiovascular: Regular rate and rhythm, no murmurs, rubs or gallops  Respiratory: Clear to auscultation bilaterally  Abdomen: Soft, non-tender, normal bowel sounds  Skin: No rash or petechiae seen  Psychiatry: Normal affect, normal mood.      Most Recent Labs:    Lab Results   Component Value Date/Time    WBC 11.9 (H) 08/29/2019 03:43 AM    RBC 5.38 08/29/2019 03:43 AM    HEMOGLOBIN 15.7 08/29/2019 03:43 AM    HEMATOCRIT 47.3 08/29/2019 03:43 AM    MCV 87.9 08/29/2019 03:43 AM    MCH 29.2 08/29/2019 03:43 AM    MCHC 33.2 (L) 08/29/2019 03:43 AM    MPV 10.2 08/29/2019 03:43 AM    NEUTSPOLYS 81.50 (H) 08/29/2019 03:43 AM    LYMPHOCYTES 10.40 (L) 08/29/2019 03:43 AM    MONOCYTES 7.50 08/29/2019 03:43 AM    EOSINOPHILS 0.10 08/29/2019 03:43 AM    BASOPHILS 0.20 08/29/2019 03:43 AM      Lab Results   Component Value Date/Time    SODIUM 135 08/29/2019 03:43 AM    POTASSIUM 4.3 08/29/2019 03:43 AM    CHLORIDE 98 08/29/2019 03:43 AM    CO2 29 08/29/2019 03:43 AM    GLUCOSE 128 (H) 08/29/2019 03:43 AM    BUN 10 08/29/2019 03:43 AM    CREATININE 0.98 08/29/2019 03:43 AM      Lab Results   Component Value Date/Time    ALTSGPT 29 08/29/2019 03:43 AM    ASTSGOT 18 08/29/2019 03:43 AM    ALKPHOSPHAT 85 08/29/2019 03:43 AM    TBILIRUBIN 0.9 08/29/2019 03:43  AM    LIPASE 31 08/28/2019 06:25 AM    LIPASE 30 08/28/2019 06:25 AM    ALBUMIN 4.3 08/29/2019 03:43 AM    GLOBULIN 2.8 08/29/2019 03:43 AM     No results found for: PROTHROMBTM, INR

## 2019-08-29 NOTE — CARE PLAN
Problem: Communication  Goal: The ability to communicate needs accurately and effectively will improve  Outcome: PROGRESSING AS EXPECTED  Note:    Pt and family was educated on plan of care.      Problem: Safety  Goal: Will remain free from injury  Outcome: PROGRESSING AS EXPECTED  Goal: Will remain free from falls  Outcome: PROGRESSING AS EXPECTED  Intervention: Implement fall precautions  Flowsheets  Taken 8/28/2019 1750  Bed Alarm: Yes - Alarm On  Taken 8/28/2019 1720  Environmental Precautions: Treaded Slipper Socks on Patient;Personal Belongings, Wastebasket, Call Bell etc. in Easy Reach;Report Given to Other Health Care Providers Regarding Fall Risk;Bed in Low Position;Communication Sign for Patients & Families;Mobility Assessed & Appropriate Sign Placed;Transferred to Stronger Side  Note:   Safety precautions in place. Call light within reach. Bed is low and in locked position. Hourly rounding in place.  Bed alarm in use.

## 2019-08-29 NOTE — PROGRESS NOTES
Patient has had no nausea, vomiting or diarrhea.  Patient has slight tenderness to left side of abdomen but has not requested pain medication.  Patient stated he thinks he can tolerate a regular diet.  Advanced diet per order.

## 2019-08-29 NOTE — PROGRESS NOTES
Internal Medicine Interval Note  Note Author: Cirilo Riojas M.D.     Name Aristides De Guzman 1980   Age/Sex 39 y.o. male   MRN [id number]   Code Status FULL     After 5PM or if no immediate response to page, please call for cross-coverage  Attending/Team: Giulia See Patient List for primary contact information  Call [phone] to page    1st Call - Day Intern (R1):   Beulah 2nd Call - Day Sr. Resident (R2/R3):   Beulah     Reason for interval visit  (Principal Problem)   Acute gastritis 2/2 food toxin ingestion in combination with polysubstance abuse    Interval Problem Daily Status Update  (24 hours, problem oriented, brief subjective history, new lab/imaging data pertinent to that problem)   This is a 39-year-old male, admitted overnight for acute gastritis secondary to food toxin ingestion combination with polysubstance abuse.  In addition, AMS was a concern.  Using the iPad translation service, patient was awake and alert this morning, with no complaints.  Symptoms have resolved.  Patient is requesting to go home.    Review of Systems   Constitutional: Negative for fever.   Respiratory: Negative for shortness of breath and wheezing.    Cardiovascular: Negative for chest pain and palpitations.   Gastrointestinal: Negative for abdominal pain, blood in stool, constipation, diarrhea, melena, nausea and vomiting.   Neurological: Negative for dizziness.       Disposition/Barriers to discharge:   None.  DC today    Consultants/Specialty  None  PCP: Pcp Pt States None      Quality Measures  Quality-Core Measures   Umana catheter::  No Umana  DVT prophylaxis - mechanical:  SCDs      Physical Exam       Vitals:    19 2100 19 0047 19 0500 19 0807   BP: 149/98 139/94 150/95 159/57   Pulse: 77 69 77 78   Resp: 18 18 18 17   Temp: 37.2 °C (99 °F) 37.2 °C (99 °F) 37.2 °C (99 °F) 36.9 °C (98.5 °F)   TempSrc: Temporal Temporal Temporal Temporal   SpO2: 98% 97% 95% 97%   Weight:        Height:         Body mass index is 31.39 kg/m².    Oxygen Therapy:  Pulse Oximetry: 97 %, O2 (LPM): 0, O2 Delivery: None (Room Air)    Physical Exam   Constitutional: He is well-developed, well-nourished, and in no distress.   HENT:   Head: Normocephalic and atraumatic.   Eyes: EOM are normal.   Neck: Neck supple.   Cardiovascular: Normal rate, regular rhythm, normal heart sounds and intact distal pulses.   Pulmonary/Chest: Effort normal and breath sounds normal.   Abdominal: Soft. Bowel sounds are normal. He exhibits distension. He exhibits no mass. There is no tenderness. There is no rebound and no guarding.   Musculoskeletal: Normal range of motion.   Neurological: He is alert.   Skin: Skin is warm and dry.   Psychiatric: Mood, memory, affect and judgment normal.       Assessment/Plan     Gastritis  Assessment & Plan  Mr. De Guzman presented with generalized abdominal pain of 12 hours duration. There has been no nausea, vomiting, or diarrhea. Abdominal examination was benign. CBC, CMP, and lipase were normal. CT scan was normal, but appendicitis could not be ruled out. The patient recently ate at a new [employer] restaurant with his brother, and the brother experienced abdominal pain and diarrhea several hours after the meal.   -This morning, symptoms have resolved  -Clear liquid diet and advance as tolerated.  - Lipase 30.        Polysubstance abuse (HCC)  Assessment & Plan  The patient admits to regular use of methamphetamine (smoking) and occasional use of opioids. He denies any IV drug use.     - Patient education and counseling provided  - No further management at this time    Alcohol use disorder, mild, abuse  Assessment & Plan  The patient reports that he drinks six beers per day. Therefore, he should be monitored for withdrawal symptoms.  No reports of withdrawal symptoms overnight.  No need for CIWA medications.    - Detox IV  - Multivitamin  - CIWA protocol    Hypokalemia  Assessment & Plan  The patient had  mild hypokalemia (3.5) at admission.    - Replete

## 2019-08-29 NOTE — CARE PLAN
Problem: Safety  Goal: Will remain free from falls  Outcome: PROGRESSING AS EXPECTED  Intervention: Implement fall precautions  Flowsheets (Taken 8/29/2019 0807)  Environmental Precautions: Treaded Slipper Socks on Patient;Personal Belongings, Wastebasket, Call Bell etc. in Easy Reach;Transferred to Stronger Side;Communication Sign for Patients & Families;Mobility Assessed & Appropriate Sign Placed;Report Given to Other Health Care Providers Regarding Fall Risk;Bed in Low Position  Note:   Safety precautions in place. Call light within reach. Bed is low and in locked position. Hourly rounding in place.       Problem: Discharge Barriers/Planning  Goal: Patient's continuum of care needs will be met  Outcome: PROGRESSING AS EXPECTED  Note:   Per MD, pt might be able to go home today.   D/c orders pending.

## 2019-08-29 NOTE — PROGRESS NOTES
Pt. discharged to home. Pt left unit via walking with his relative. Denied WC.   IV pulled out.   Prescription for protonix given to the pt    Mary SOMMER reviewed discharge instructions, follow up appointments, and prescription with the patient . Patient states understanding of all the instructions. Discharge instructions, prescriptions, and personal belongings with patient upon leaving.

## 2019-08-29 NOTE — PROGRESS NOTES
Spoke with Dr. Augustine that pt is discharging home and clarified that he does not require any medications for home.

## 2019-08-29 NOTE — PROGRESS NOTES
Pt arrived to the unit with the transporter via gurney.   Pt is AO x 4  On RA  Clear liquid diet  Detox bag running    2 RN skin check completed with Kandice SOMMER. No skin breakdown noted.     Safety precautions in place. Call light within reach. Bed in low and locked position. Hourly rounding in place. Updated on plan of care. Questions answered.

## 2021-08-03 ENCOUNTER — HOSPITAL ENCOUNTER (EMERGENCY)
Facility: MEDICAL CENTER | Age: 41
End: 2021-08-03
Attending: EMERGENCY MEDICINE

## 2021-08-03 VITALS
HEIGHT: 66 IN | DIASTOLIC BLOOD PRESSURE: 85 MMHG | HEART RATE: 63 BPM | TEMPERATURE: 97.6 F | SYSTOLIC BLOOD PRESSURE: 141 MMHG | BODY MASS INDEX: 31.53 KG/M2 | OXYGEN SATURATION: 96 % | RESPIRATION RATE: 14 BRPM | WEIGHT: 196.21 LBS

## 2021-08-03 DIAGNOSIS — S00.431A HEMATOMA OF RIGHT AURICULAR REGION: ICD-10-CM

## 2021-08-03 PROCEDURE — 700111 HCHG RX REV CODE 636 W/ 250 OVERRIDE (IP): Performed by: EMERGENCY MEDICINE

## 2021-08-03 PROCEDURE — A9270 NON-COVERED ITEM OR SERVICE: HCPCS | Performed by: EMERGENCY MEDICINE

## 2021-08-03 PROCEDURE — 99284 EMERGENCY DEPT VISIT MOD MDM: CPT

## 2021-08-03 PROCEDURE — 700102 HCHG RX REV CODE 250 W/ 637 OVERRIDE(OP): Performed by: EMERGENCY MEDICINE

## 2021-08-03 PROCEDURE — 87205 SMEAR GRAM STAIN: CPT

## 2021-08-03 PROCEDURE — 96372 THER/PROPH/DIAG INJ SC/IM: CPT

## 2021-08-03 PROCEDURE — 303977 HCHG I & D

## 2021-08-03 PROCEDURE — 700101 HCHG RX REV CODE 250: Performed by: EMERGENCY MEDICINE

## 2021-08-03 PROCEDURE — 87070 CULTURE OTHR SPECIMN AEROBIC: CPT

## 2021-08-03 RX ORDER — CEFTRIAXONE 1 G/1
1000 INJECTION, POWDER, FOR SOLUTION INTRAMUSCULAR; INTRAVENOUS ONCE
Status: COMPLETED | OUTPATIENT
Start: 2021-08-03 | End: 2021-08-03

## 2021-08-03 RX ORDER — SULFAMETHOXAZOLE AND TRIMETHOPRIM 800; 160 MG/1; MG/1
1 TABLET ORAL EVERY 12 HOURS
Qty: 14 TABLET | Refills: 0 | Status: SHIPPED | OUTPATIENT
Start: 2021-08-03 | End: 2021-08-10

## 2021-08-03 RX ORDER — CEPHALEXIN 500 MG/1
500 CAPSULE ORAL ONCE
Status: COMPLETED | OUTPATIENT
Start: 2021-08-03 | End: 2021-08-03

## 2021-08-03 RX ORDER — LIDOCAINE HYDROCHLORIDE 10 MG/ML
20 INJECTION, SOLUTION INFILTRATION; PERINEURAL ONCE
Status: COMPLETED | OUTPATIENT
Start: 2021-08-03 | End: 2021-08-03

## 2021-08-03 RX ORDER — SULFAMETHOXAZOLE AND TRIMETHOPRIM 800; 160 MG/1; MG/1
1 TABLET ORAL ONCE
Status: COMPLETED | OUTPATIENT
Start: 2021-08-03 | End: 2021-08-03

## 2021-08-03 RX ORDER — CEPHALEXIN 500 MG/1
500 CAPSULE ORAL 4 TIMES DAILY
Qty: 28 CAPSULE | Refills: 0 | Status: SHIPPED | OUTPATIENT
Start: 2021-08-03 | End: 2021-08-10

## 2021-08-03 RX ADMIN — CEFTRIAXONE SODIUM 1000 MG: 1 INJECTION, POWDER, FOR SOLUTION INTRAMUSCULAR; INTRAVENOUS at 18:37

## 2021-08-03 RX ADMIN — CEPHALEXIN 500 MG: 500 CAPSULE ORAL at 17:49

## 2021-08-03 RX ADMIN — LIDOCAINE HYDROCHLORIDE 20 ML: 10 INJECTION, SOLUTION INFILTRATION; PERINEURAL at 17:50

## 2021-08-03 RX ADMIN — SULFAMETHOXAZOLE AND TRIMETHOPRIM 1 TABLET: 800; 160 TABLET ORAL at 17:50

## 2021-08-03 ASSESSMENT — FIBROSIS 4 INDEX: FIB4 SCORE: 0.65

## 2021-08-03 NOTE — ED TRIAGE NOTES
Pt to ED with complaints of right outer ear swelling x4-5 days with increasing pain. No fever. Ear is hot. No drainage. Denies trauma. States it is possible there was a insect bite but is not sure.     Pt educated on ED process and asked to wait in lobby. Patient educated on importance of alerting staff to new or worsening symptoms or concerns.

## 2021-08-03 NOTE — Clinical Note
Aristides De Guzman was seen and treated in our emergency department on 8/3/2021.  He may return to work on 08/05/2021.       If you have any questions or concerns, please don't hesitate to call.      Jesus Velazco M.D.

## 2021-08-04 LAB
GRAM STN SPEC: NORMAL
SIGNIFICANT IND 70042: NORMAL
SITE SITE: NORMAL
SOURCE SOURCE: NORMAL

## 2021-08-04 NOTE — ED NOTES
Pt discharged to home. Pt was given follow up instructions and prescriptions for keflex and bactrim. Pt verbalized understanding of all instructions for discharge and is ambulatory out of ED with steady gait. AOx4

## 2021-08-04 NOTE — DISCHARGE INSTRUCTIONS
Off work tomorrow to let this heal then.  May return on Thursday.  Soap and water cleanse 3 times daily and take Bactrim and Keflex as instructed

## 2021-08-05 ENCOUNTER — HOSPITAL ENCOUNTER (EMERGENCY)
Facility: MEDICAL CENTER | Age: 41
End: 2021-08-05
Attending: EMERGENCY MEDICINE

## 2021-08-05 VITALS
BODY MASS INDEX: 31.07 KG/M2 | RESPIRATION RATE: 16 BRPM | OXYGEN SATURATION: 99 % | DIASTOLIC BLOOD PRESSURE: 81 MMHG | WEIGHT: 193.34 LBS | TEMPERATURE: 97.6 F | HEART RATE: 77 BPM | HEIGHT: 66 IN | SYSTOLIC BLOOD PRESSURE: 122 MMHG

## 2021-08-05 DIAGNOSIS — H60.01 ABSCESS OF RIGHT EXTERNAL EAR: ICD-10-CM

## 2021-08-05 LAB
BACTERIA WND AEROBE CULT: NORMAL
GRAM STN SPEC: NORMAL
SIGNIFICANT IND 70042: NORMAL
SITE SITE: NORMAL
SOURCE SOURCE: NORMAL

## 2021-08-05 PROCEDURE — A6403 STERILE GAUZE>16 <= 48 SQ IN: HCPCS

## 2021-08-05 PROCEDURE — 99283 EMERGENCY DEPT VISIT LOW MDM: CPT

## 2021-08-05 PROCEDURE — 700101 HCHG RX REV CODE 250: Performed by: EMERGENCY MEDICINE

## 2021-08-05 PROCEDURE — 303977 HCHG I & D

## 2021-08-05 RX ORDER — LIDOCAINE HYDROCHLORIDE 20 MG/ML
20 INJECTION, SOLUTION INFILTRATION; PERINEURAL ONCE
Status: COMPLETED | OUTPATIENT
Start: 2021-08-05 | End: 2021-08-05

## 2021-08-05 RX ADMIN — LIDOCAINE HYDROCHLORIDE 20 ML: 20 INJECTION, SOLUTION INFILTRATION; PERINEURAL at 14:15

## 2021-08-05 ASSESSMENT — FIBROSIS 4 INDEX: FIB4 SCORE: 0.65

## 2021-08-05 ASSESSMENT — LIFESTYLE VARIABLES: DO YOU DRINK ALCOHOL: NO

## 2021-08-05 NOTE — DISCHARGE INSTRUCTIONS
You will need to continue the head dressing wrap for the next 5 days.  Please replace it once a day.  If you develop a fever, worsening pain or have any other concerns please return to the emergency department.  I would like you to follow-up with the ENT doctor within the next week.

## 2021-08-05 NOTE — ED PROVIDER NOTES
ED Provider Note    CHIEF COMPLAINT  Chief Complaint   Patient presents with   • Ear Pain     RT ear x6 days now, seen here 2 days ago for same, dx'd with a hematoma. been taking rx'd abx.        HPI  Aristides De Guzman is a 41 y.o. male who presents with chief complaint of ear pain.  Patient was seen on the third for identical complaint, at that point he had a medial incision drainage of his ear with bloody drainage on aspiration.  Patient was discharged home with Bactrim and Keflex.  Patient denies any associated fevers.  Patient denies any trauma, he does work as a  and reports there could have been a shingle that struck him in the ear but he does not specifically recall this.  Patient denies any history of diabetes.  Patient denies any associated nausea or vomiting.  Patient reports that after the needle drainage his ear looked better and felt better however upon returning home the ear began to swell up again and has since become swollen and painful back to its peak when patient was seen here 2 days ago.    REVIEW OF SYSTEMS  ROS    See HPI for further details. All other systems are negative.     PAST MEDICAL HISTORY       SOCIAL HISTORY  Social History     Tobacco Use   • Smoking status: Current Every Day Smoker   • Smokeless tobacco: Never Used   Vaping Use   • Vaping Use: Former   Substance and Sexual Activity   • Alcohol use: Yes     Comment: 4-5 beers per day, more on weekends   • Drug use: Not Currently   • Sexual activity: Not on file       SURGICAL HISTORY  patient denies any surgical history    CURRENT MEDICATIONS  Home Medications     Reviewed by Mike Cotton R.N. (Registered Nurse) on 08/05/21 at 1145  Med List Status: Partial   Medication Last Dose Status   cephALEXin (KEFLEX) 500 MG Cap  Active   sulfamethoxazole-trimethoprim (BACTRIM DS) 800-160 MG tablet  Active                ALLERGIES  No Known Allergies    PHYSICAL EXAM  Vitals:    08/05/21 1138   BP: 124/82   Pulse: 75   Resp: 16   Temp:  36.4 °C (97.6 °F)   SpO2: 96%       Physical Exam  Constitutional:       Appearance: He is well-developed.   HENT:      Head:      Comments: Right canal with considerable edema and erythema which is diffuse but most pronounced inferior to the superior helix.  No associated evidence of any lacerations bites or trauma.  The tympanic membrane of the affected ear with what appears to be chronic perforation, sclerotic borders.  No associated cholesteatoma that I can appreciate  Eyes:      Conjunctiva/sclera: Conjunctivae normal.   Pulmonary:      Effort: Pulmonary effort is normal.   Musculoskeletal:      Cervical back: Normal range of motion and neck supple.   Skin:     General: Skin is warm.   Neurological:      Mental Status: He is alert and oriented to person, place, and time.   Psychiatric:         Behavior: Behavior normal.           DIAGNOSTIC STUDIES / PROCEDURES    Patient provided verbal consent for incision and drainage of right ear abscess  Risk benefits alternatives were discussed  Auricular block was obtained using 6 cc of 2% lidocaine  An 11 blade was used to make 0.5 cm incision, approximately 5 to 10 cc of bloody exudate were released  Compressive dressing was then placed on the ear  Patient tolerated well    COURSE & MEDICAL DECISION MAKING  Pertinent Labs & Imaging studies reviewed. (See chart for details)    Patient here with abscess of his right ear.  I performed incision and drainage as above.  I encouraged patient to continue taking his antibiotics.  Patient will follow up with ENT.  I did discuss the case with ENT and they are comfortable with management.    The patient will return for worsening symptoms and is stable at the time of discharge. The patient verbalizes understanding and will comply.    FINAL IMPRESSION    1. Abscess of right external ear               Electronically signed by: Isidro Clark M.D., 8/5/2021 2:19 PM

## 2021-08-08 ENCOUNTER — HOSPITAL ENCOUNTER (EMERGENCY)
Facility: MEDICAL CENTER | Age: 41
End: 2021-08-08
Attending: EMERGENCY MEDICINE

## 2021-08-08 VITALS
HEART RATE: 75 BPM | WEIGHT: 194 LBS | RESPIRATION RATE: 18 BRPM | DIASTOLIC BLOOD PRESSURE: 80 MMHG | SYSTOLIC BLOOD PRESSURE: 134 MMHG | HEIGHT: 66 IN | TEMPERATURE: 97.5 F | BODY MASS INDEX: 31.18 KG/M2 | OXYGEN SATURATION: 100 %

## 2021-08-08 DIAGNOSIS — S00.431A HEMATOMA OF RIGHT PINNA, INITIAL ENCOUNTER: ICD-10-CM

## 2021-08-08 LAB — GLUCOSE BLD-MCNC: 90 MG/DL (ref 65–99)

## 2021-08-08 PROCEDURE — 700111 HCHG RX REV CODE 636 W/ 250 OVERRIDE (IP): Performed by: EMERGENCY MEDICINE

## 2021-08-08 PROCEDURE — 303977 HCHG I & D

## 2021-08-08 PROCEDURE — A9270 NON-COVERED ITEM OR SERVICE: HCPCS | Performed by: EMERGENCY MEDICINE

## 2021-08-08 PROCEDURE — 700102 HCHG RX REV CODE 250 W/ 637 OVERRIDE(OP): Performed by: EMERGENCY MEDICINE

## 2021-08-08 PROCEDURE — 99283 EMERGENCY DEPT VISIT LOW MDM: CPT

## 2021-08-08 PROCEDURE — 82962 GLUCOSE BLOOD TEST: CPT

## 2021-08-08 RX ORDER — AMOXICILLIN AND CLAVULANATE POTASSIUM 875; 125 MG/1; MG/1
1 TABLET, FILM COATED ORAL 2 TIMES DAILY
Qty: 14 TABLET | Refills: 0 | Status: SHIPPED | OUTPATIENT
Start: 2021-08-08 | End: 2021-08-15

## 2021-08-08 RX ORDER — BUPIVACAINE HYDROCHLORIDE 2.5 MG/ML
10 INJECTION, SOLUTION EPIDURAL; INFILTRATION; INTRACAUDAL ONCE
Status: COMPLETED | OUTPATIENT
Start: 2021-08-08 | End: 2021-08-08

## 2021-08-08 RX ORDER — AMOXICILLIN AND CLAVULANATE POTASSIUM 875; 125 MG/1; MG/1
1 TABLET, FILM COATED ORAL ONCE
Status: COMPLETED | OUTPATIENT
Start: 2021-08-08 | End: 2021-08-08

## 2021-08-08 RX ADMIN — AMOXICILLIN AND CLAVULANATE POTASSIUM 1 TABLET: 875; 125 TABLET, FILM COATED ORAL at 17:02

## 2021-08-08 RX ADMIN — BUPIVACAINE HYDROCHLORIDE 10 ML: 2.5 INJECTION, SOLUTION EPIDURAL; INFILTRATION; INTRACAUDAL; PERINEURAL at 18:02

## 2021-08-08 ASSESSMENT — FIBROSIS 4 INDEX: FIB4 SCORE: 0.65

## 2021-08-08 NOTE — ED TRIAGE NOTES
"Pt was seen at [facility] [place] ago and evaluated/treted for recurrence of right ear pain.  He was eventually discharge in stable condition with a Dx of external right ear abscess.  He returns today complaining of worsening pain.  Chief Complaint   Patient presents with   • Earache     /87   Pulse 77   Temp 36.4 °C (97.5 °F) (Temporal)   Resp 20   Ht 1.676 m (5' 6\")   Wt 88 kg (194 lb 0.1 oz)   SpO2 99%   BMI 31.31 kg/m²      "

## 2021-08-08 NOTE — DISCHARGE INSTRUCTIONS
You were seen and evaluated in the Emergency Department at [facility] for:     Right ear pain and swelling, recurrent.    You had the following tests and studies:    Blood sugar was stable.    You received the following medications:    Injectable numbing medicine, Augmentin.    You received the following prescriptions:    Augmentin, take as prescribed.  Use this instead of the other antibiotics prescribed previously.  Take ibuprofen 3 times per day for the next 3 days.  ----------------------------    Please make sure to follow up with:    ENT specialist Dr. Barrow, you have been given her information previously so please call or go to her office tomorrow for recheck and definitive care, if you have any worsening symptoms return to the ER immediately.  ----------------------------    We always encourage patients to return IMMEDIATELY if they have:  Increased or changing pain, passing out, fevers over 100.4 (taken in your mouth or rectally) for more than 2 days, redness or swelling of skin or tissues, feeling like your heart is beating fast, chest pain that is new or worsening, trouble breathing, feeling like your throat is closing up and can not breath, inability to walk, weakness of any part of your body, new dizziness, severe bleeding that won't stop from any part of your body, if you can't eat or drink, or if you have any other concerns.   If you feel worse, please know that you can always return with any questions, concerns, worse symptoms, or you are feeling unsafe. We certainly cannot say for sure that we have ruled out every illness or dangerous disease, but we feel that at this specific time, your exam, tests, and vital signs like heart rate and blood pressure are safe for discharge.       Fue visto y evaluado en el Departamento de Emergencias de [facility] por:    Dolor e hinchazón del oído derecho, recurrentes.    Tuviste las siguientes pruebas y estudios:    El azúcar en leann se  mantuvo estable.    Recibiste los siguientes medicamentos:    Medicamento anestésico inyectable, Augmentin.    Recibió las siguientes recetas:    Augmentin, eliza según lo prescrito. Use esto en lugar de los otros antibióticos recetados anteriormente. [place] ibuprofeno 3 veces al día tamika los próximos 3 días.  ----------------------------    Asegúrese de hacer un seguimiento con:    La especialista en otorrinolaringología . [place], le khalil dado coffman información anteriormente, así que llame o vaya a coffman consultorio mañana para lucita revisión y atención definitiva. Si tiene algún síntoma que empeora, regrese a la aubrie de emergencias de inmediato.  ----------------------------    Siempre alentamos a los pacientes a regresar INMEDIATAMENTE si tienen:  Dolor que aumenta o cambia, desmayo, fiebre superior a 100,4 (en la boca o por el recto) tamika más de 2 días, enrojecimiento o hinchazón de la piel o los tejidos, sensación de que el corazón late rápido, dolor en el pecho nuevo o que empeora, problemas respirar, sentir que coffman garganta se está cerrando y no puede respirar, incapacidad para caminar, debilidad de cualquier parte de coffman cuerpo, nuevos mareos, sangrado severo que no se detiene en ninguna parte de coffman cuerpo, si no puede comer o beber, o si tiene alguna otra inquietud.  Si se siente peor, sepa que siempre puede regresar con cualquier pregunta, inquietud, síntomas peores o si se siente inseguro. Ciertamente no podemos decir con certeza que hemos descartado todas las enfermedades o enfermedades peligrosas, chantal creemos que en pamela momento específico, coffman examen, pruebas y signos vitales juve la frecuencia cardíaca y la presión arterial son seguros para el helga.

## 2021-08-08 NOTE — ED PROVIDER NOTES
ED Provider Note    CHIEF COMPLAINT  Chief Complaint   Patient presents with   • Earache       HPI    Primary care provider: None  Means of arrival: POV/Walk-in  History obtained from: Patient  History limited by: Nothing    Aristides De Guzman is a 41 y.o. male who presents with recurrent right ear pain and swelling.  This is happened several times over the past week.  Patient has been seen at our [facility]'s emergency department twice for this and had recurrent incision and drainage.  Suspected auricular hematoma versus abscess.  He has been referred to ENT but has not followed up.  He works as a  thinks it is possible he could have had some trauma but no specific inciting event noted.  No prior episodes.  Patient denies any past medical history or daily medications.  No fevers, headache, vision changes, runny nose or sore throat.  No cough or chest pain or dyspnea.  Has not been vaccinated against Covid.  Been taking Keflex and Bactrim.  Denies any redness or suppurative discharge.    REVIEW OF SYSTEMS  Constitutional: Negative for fever or chills.   HENT: Positive for right ear pain and swelling, negative for sore throat or rhinorrhea.  Eyes: Negative for vision changes or redness or discharge.   Respiratory: Negative for cough or shortness of breath.    Cardiovascular: Negative for chest pain or syncope.   Gastrointestinal: Negative for nausea, vomiting, abdominal pain.   Genitourinary: Negative for dysuria or flank pain.   Musculoskeletal: Negative for back pain or joint pain.   Skin: Negative for itching or rash.   Neurological: Negative for sensory or motor changes.     PAST MEDICAL HISTORY  Patient denies any chronic past medical history.    PAST FAMILY HISTORY  Patient denies any pertinent past family history.    SOCIAL HISTORY  Social History     Tobacco Use   • Smoking status: Current Every Day Smoker   • Smokeless tobacco: Never Used   Vaping Use   • Vaping Use: Former   Substance and Sexual  "Activity   • Alcohol use: Yes     Comment: 4-5 beers per day, more on weekends   • Drug use: Not Currently   • Sexual activity: Not on file       SURGICAL HISTORY  patient denies any surgical history    CURRENT MEDICATIONS    No daily meds, has been on Keflex and Bactrim recently for this similar complaint.  ALLERGIES  No Known Allergies    PHYSICAL EXAM  VITAL SIGNS: /80   Pulse 75   Temp 36.4 °C (97.5 °F) (Temporal)   Resp 18   Ht 1.676 m (5' 6\")   Wt 88 kg (194 lb 0.1 oz)   SpO2 100%   BMI 31.31 kg/m²    Pulse ox interpretation: On room air, I interpret this pulse ox as normal.  Constitutional: Well-developed, well-nourished. Sitting up.   HEENT: Normocephalic, atraumatic. Posterior pharynx clear, mucous membranes moist.  No mastoid tenderness or fullness or bogginess bilaterally, the right auricle is significantly swollen there is obviously a prior incision and drainage site that is clean dry and intact there is no erythema to the auricle.  TMs appear normal.  Left auricle normal.  Eyes:  EOMI. Normal sclerae.  Neck: Supple, nontender.  Chest/Pulmonary: Clear to ausculation bilaterally, no wheezes or rhonchi.  Cardiovascular: Regular rate and rhythm, no murmur.   Abdomen: Soft, nontender; no rebound, guarding, or masses.  Back: No CVA or midline tenderness.   Musculoskeletal: No deformity or edema.  Neuro: Clear speech, normal coordination, cranial nerves II-XII grossly intact, no focal asymmetry or sensory deficits.   Psych: Normal mood and affect.  Skin: See ENT exam for description of your skin otherwise warm and dry without any other lesions.      DIAGNOSTIC STUDIES / PROCEDURES    Results for orders placed or performed during the hospital encounter of 08/08/21   POCT glucose device results   Result Value Ref Range    Glucose - Accu-Ck 90 65 - 99 mg/dL       RADIOLOGY  No orders to display       PROCEDURES  Incision and Drainage Procedure Note    Indication: Retained auricular " hematoma.    Procedure: The patient was positioned appropriately and the skin over the incision site was prepped with chlorhexidine and draped in a sterile fashion. Local anesthesia was was obtained with full auricular block with bupivacaine.  An incision was then made over the greatest area of fluctuance and approximately 4 cc of serous material was expressed. Loculations were not present. The drainage cavity was then dressed with a sterile dressing. The patient’s tetanus status was up to date and did not require a booster dose.    The patient tolerated the procedure well.    Complications: None        COURSE & MEDICAL DECISION MAKING    This is a 41 y.o. male who presents with recurrent right ear pain and swelling.    Differential Diagnosis includes but is not limited to:  Auricular hematoma, abscess, cauliflower ear, scar tissue, diabetes    ED Course:  41-year-old male with above complaints.  Obviously swollen auricular hematoma for cosmesis likely recurrent drainage, but had a very bryce discussion with the patient that he needs follow-up with an ENT specialist as soon as possible.  He is already been referred to ENT but has yet to do so.  Today is Sunday, he has follow-up paperwork information from past ER visit with the ENT he plans to follow-up with, Dr. Barrow, and I have stressed the need for him to follow-up with the specialist otherwise he might lose function or complete cosmesis of his ear.  Patient has not had any lab work-up done I will get a fingerstick to make sure he is not significantly hyperglycemic to make sure he is not an undiagnosed diabetic, and then I will likely redrain this year and try to pack with a pressure dressing as best as possible.    Hematoma drained, see procedure note.  Patient was informed that this will likely recur if he does get definitive care with ENT.  Return for any new or worsening symptoms.    Medications   bupivacaine 0.25% (SENSORCAINE-MARCAINE) pf injection 10 mL  (10 mL Injection Given 8/8/21 1802)   amoxicillin-clavulanate (AUGMENTIN) 875-125 MG per tablet 1 tablet (1 tablet Oral Given 8/8/21 1702)       FINAL IMPRESSION  1. Hematoma of right pinna, initial encounter        PRESCRIPTIONS  Discharge Medication List as of 8/8/2021  5:29 PM      START taking these medications    Details   amoxicillin-clavulanate (AUGMENTIN) 875-125 MG Tab Take 1 tablet by mouth 2 times a day for 7 days., Disp-14 tablet, R-0, Print Rx Paper             FOLLOW UP  [facility], Emergency Dept  [address]  [facility] [id number] [phone]  Today  If you have ANY new or worse symptoms!    Maria Eugenia Barrow M.D.  [address] [place] [id number]  [phone]    Call in 1 day  for recheck and definitive care with ENT    [address] [address] [phone]  Schedule an appointment as soon as possible for a visit in 2 days  for recheck and routine health care      -DISCHARGE-       Results, exam findings, clinical impression, presumed diagnosis, treatment options, and strict return precautions were discussed with the patient, and they verbalized understanding, agreed with, and appreciated the plan of care.    The patient is referred to a primary physician for blood pressure management, diabetic screening, and for all other preventative health concerns.     Portions of this record were made with voice recognition software.  Despite my review, spelling/grammar/context errors may still remain.  Interpretation of this chart should be taken in this context.    Electronically signed by Simon Fox M.D. on 8/8/2021 at 11:24 PM.

## 2022-02-24 NOTE — ED TRIAGE NOTES
"Aristides De Guzman  39 y.o. male  Chief Complaint   Patient presents with   • Abdominal Pain     Diffuse and constant since 3pm yesterday       Pt amb to triage with steady gait for above complaint.   Pt is alert and oriented, speaking in full sentences, follows commands and responds appropriately to questions. NAD. Resp are even and unlabored.  Pt placed in lobby. Pt educated on triage process. Pt encouraged to alert staff for any changes.  BP (!) 161/91   Pulse 65   Temp 36 °C (96.8 °F) (Temporal)   Resp 16   Ht 1.702 m (5' 7\") Comment: Simultaneous filing. User may not have seen previous data.  Wt 90.9 kg (200 lb 6.4 oz)   SpO2 96%   BMI 31.39 kg/m²     " Reason for call:  Patient reporting a symptom    Symptom or request: gas pain, stomach pain    Duration (how long have symptoms been present): seen today     Have you been treated for this before? Seen today by Pedrito Corona    Additional comments: States he is needing reflief form this pain in his stomach it is getting worse    Phone Number patient can be reached at:  Cell number on file:    Telephone Information:   Mobile [phone]       Call taken on 2/24/2022 at 3:10 PM by Shavon Root

## 2022-06-23 NOTE — ED TRIAGE NOTES
"Chief Complaint   Patient presents with   • Ear Pain     RT ear x6 days now, seen here 2 days ago for same, dx'd with a hematoma. been taking rx'd abx.      Pt to triage for above. Sami translation used via ipad.   Denies fevers. Had culture taken 2 days ago as well.  NAD noted, VSS.    /82   Pulse 75   Temp 36.4 °C (97.6 °F) (Temporal)   Resp 16   Ht 1.676 m (5' 6\")   Wt 87.7 kg (193 lb 5.5 oz)   SpO2 96%   BMI 31.21 kg/m²     " ALLERGIES:  No Known Allergies     CC: Follow-up    SUBJECTIVE    HPI:Codey Valadez is a 55 year old male who presents today for follow-up.  Blood pressure is still elevated.  Repeat testosterone level is normal.  Patient took high-dose vitamin D daily rather than weekly.  No adverse effects.  I advised to take daily over-the-counter calcium and vitamin D and will repeat levels in 3 months.  Patient took Chantix and ended up with the jitteriness.  He was on 10th day.  No suicidal thoughts or depression.  Patient is willing to try a smaller dose.  He will take Chantix 1 mg daily.  Patient is in process of scheduling heart CT scan.  He feels lower extremities are heavy and swollen though they are not.  They feel stiff in the morning.  Once he starts walking around he feels better.  He was feeling weakness also couple of months ago when he was sick.  His varicose veins are prominent.       Medication:  Current Outpatient Medications   Medication Sig Dispense Refill   • varenicline (CHANTIX) 1 MG tablet Take 1 tablet by mouth daily. TAKE 1 TABLET BY MOUTH TWICE DAILY AFTER FINISH 0.5MG STARTER 180 tablet 0   • hydroCHLOROthiazide (HYDRODIURIL) 25 MG tablet Take 1 tablet by mouth daily. 90 tablet 0   • Vitamin D, Ergocalciferol, 1.25 mg (50,000 units) capsule Take 1 capsule by mouth 1 day a week. 12 capsule 0   • Multiple Vitamins-Minerals (vitamin - therapeutic multivitamins w/minerals) tablet        No current facility-administered medications for this visit.       Past Medical History:   Diagnosis Date   • Essential hypertension 6/23/2022   • Prediabetes 6/23/2022   • Varicose veins of both lower extremities with pain 6/23/2022   • Vitamin D deficiency, unspecified 6/23/2022     No past surgical history on file.  Family History   Problem Relation Age of Onset   • Hypertension Mother    • Other Mother         Polio,S/P polio,Choriocarcinoma   • Kidney disease Mother         Nephrectomy for renal artery stenosis   •  Myocardial Infarction Father         s/p cabg   • Cancer, Lung Father    • Hypertension Father    • Peripheral Vascular Disease Father    • Hypertension Brother    • Cancer Maternal Grandmother         Ovarian cancer   • Heart disease Maternal Grandfather          at age 75   • COPD Paternal Grandmother    • Heart disease Paternal Grandfather          at age 75   • Cancer, Prostate Maternal Uncle    • Heart disease Maternal Uncle         age 84   • Heart disease Maternal Uncle          at age 52   • Heart disease Maternal Uncle         age 72,Heart attacke at age 64   • Cancer Paternal Uncle         Bladder cancer     Social History     Tobacco Use   • Smoking status: Current Every Day Smoker     Packs/day: 1.00     Years: 35.00     Pack years: 35.00   • Smokeless tobacco: Never Used   Vaping Use   • Vaping Use: never used   Substance Use Topics   • Alcohol use: Yes     Comment: 2-3 drinks a day   • Drug use: Never           Review of Systems:    All other systems were reviewed and they are negative.    OBJECTIVE  Objective   BP (!) 158/90   Pulse 96   Temp 98 °F (36.7 °C) (Temporal)   Resp 18   Ht 6' 2\" (1.88 m)   Wt 79.2 kg (174 lb 9.6 oz)   BMI 22.42 kg/m²   BSA 2.05 m²     Physical Exam:  General appearance - The patient is alert and oriented x3. The patient is well appearing. The patient is in no acute distress.  Skin -bilateral lower extremity varicose veins      Codey was seen today for follow-up and office visit.    Diagnoses and all orders for this visit:    Essential hypertension    Low testosterone    Vitamin D deficiency, unspecified    Tobacco abuse    Snoring  -     SERVICE TO PULMONARY MEDICINE    Varicose veins of both lower extremities with pain  -     TRANSTHORACIC ECHO(TTE) COMPLETE W/ W/O IMAGING AGENT; Future  -     SERVICE TO CARDIOLOGY    Other orders  -     hydroCHLOROthiazide (HYDRODIURIL) 25 MG tablet; Take 1 tablet by mouth daily.      I will refer him to cardiology for  varicose veins treatment.  I will also order heart echocardiogram.  He will proceed with heart CT scan.  Patient is willing to start treatment for hypertension.  He is also willing to get the sleep study and I will make a referral.  I will start him on hydrochlorothiazide.  I discussed possible side effects.  I will see him back in 2 weeks.  He will try Chantix at a smaller dose of 1 mg daily.  If still has side effects we will try Wellbutrin.  Patient is working on healthy lifestyle.  He is scheduled for colonoscopy.  He is following with urology  Relevant records,labs and diagnostics were reviewed.    Follow-up if symptoms do not improve or worsen, patient instructed to call the office or go to Immediate Care.      Electronically signed by: Milton Woodward MD